# Patient Record
Sex: FEMALE | Race: BLACK OR AFRICAN AMERICAN | NOT HISPANIC OR LATINO
[De-identification: names, ages, dates, MRNs, and addresses within clinical notes are randomized per-mention and may not be internally consistent; named-entity substitution may affect disease eponyms.]

---

## 2017-09-19 ENCOUNTER — RECORD ABSTRACTING (OUTPATIENT)
Age: 82
End: 2017-09-19

## 2017-09-19 DIAGNOSIS — F32.2 MAJOR DEPRESSIVE DISORDER, SINGLE EPISODE, SEVERE W/OUT PSYCHOTIC FEATURES: ICD-10-CM

## 2017-09-19 DIAGNOSIS — Z98.890 OTHER SPECIFIED POSTPROCEDURAL STATES: ICD-10-CM

## 2017-09-19 DIAGNOSIS — E11.9 TYPE 2 DIABETES MELLITUS W/OUT COMPLICATIONS: ICD-10-CM
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
## 2017-09-19 RX ORDER — FUROSEMIDE 20 MG/1
20 TABLET ORAL DAILY
Refills: 0 | Status: ACTIVE | COMMUNITY

## 2017-09-19 RX ORDER — NIFEDIPINE 90 MG/1
90 TABLET, EXTENDED RELEASE ORAL DAILY
Refills: 0 | Status: ACTIVE | COMMUNITY

## 2017-09-19 RX ORDER — ASPIRIN 81 MG/1
81 TABLET, CHEWABLE ORAL DAILY
Refills: 0 | Status: ACTIVE | COMMUNITY

## 2017-09-19 RX ORDER — GABAPENTIN 300 MG/1
300 CAPSULE ORAL
Qty: 270 | Refills: 1 | Status: ACTIVE | COMMUNITY

## 2017-09-19 RX ORDER — CARVEDILOL 25 MG/1
25 TABLET, FILM COATED ORAL TWICE DAILY
Refills: 0 | Status: ACTIVE | COMMUNITY

## 2017-09-19 RX ORDER — LUBIPROSTONE 8 UG/1
8 CAPSULE, GELATIN COATED ORAL
Refills: 0 | Status: ACTIVE | COMMUNITY

## 2017-09-19 RX ORDER — BRIMONIDINE TARTRATE 1 MG/ML
0.1 SOLUTION/ DROPS OPHTHALMIC
Refills: 0 | Status: ACTIVE | COMMUNITY

## 2017-09-19 RX ORDER — POTASSIUM CHLORIDE 1500 MG/1
20 TABLET, EXTENDED RELEASE ORAL DAILY
Refills: 0 | Status: ACTIVE | COMMUNITY

## 2017-09-19 RX ORDER — BETAXOLOL HYDROCHLORIDE 2.8 MG/ML
0.25 SUSPENSION/ DROPS OPHTHALMIC
Refills: 0 | Status: ACTIVE | COMMUNITY

## 2017-09-19 RX ORDER — SITAGLIPTIN 100 MG/1
100 TABLET, FILM COATED ORAL DAILY
Refills: 0 | Status: ACTIVE | COMMUNITY

## 2017-09-19 RX ORDER — DULOXETINE HYDROCHLORIDE 60 MG/1
60 CAPSULE, DELAYED RELEASE ORAL
Refills: 0 | Status: ACTIVE | COMMUNITY

## 2017-09-19 RX ORDER — BUPROPION HYDROCHLORIDE 200 MG/1
200 TABLET, FILM COATED ORAL DAILY
Refills: 0 | Status: ACTIVE | COMMUNITY

## 2017-09-19 RX ORDER — UBIDECARENONE/VIT E ACET 100MG-5
50 MCG CAPSULE ORAL
Refills: 0 | Status: ACTIVE | COMMUNITY

## 2017-09-19 RX ORDER — PRAVASTATIN SODIUM 10 MG/1
10 TABLET ORAL DAILY
Refills: 0 | Status: ACTIVE | COMMUNITY

## 2017-09-19 RX ORDER — SUCRALFATE 1 G/10ML
1 SUSPENSION ORAL
Refills: 0 | Status: ACTIVE | COMMUNITY

## 2017-09-19 RX ORDER — HYDRALAZINE HYDROCHLORIDE 100 MG/1
100 TABLET ORAL 3 TIMES DAILY
Refills: 0 | Status: ACTIVE | COMMUNITY

## 2017-09-19 RX ORDER — HYDROCHLOROTHIAZIDE 25 MG/1
25 TABLET ORAL DAILY
Refills: 0 | Status: ACTIVE | COMMUNITY

## 2017-10-19 ENCOUNTER — APPOINTMENT (OUTPATIENT)
Dept: CARDIOLOGY | Facility: CLINIC | Age: 82
End: 2017-10-19

## 2017-11-16 ENCOUNTER — APPOINTMENT (OUTPATIENT)
Dept: PULMONOLOGY | Facility: CLINIC | Age: 82
End: 2017-11-16
Payer: MEDICARE

## 2017-11-16 VITALS
HEIGHT: 61 IN | BODY MASS INDEX: 28.89 KG/M2 | DIASTOLIC BLOOD PRESSURE: 53 MMHG | OXYGEN SATURATION: 96 % | HEART RATE: 77 BPM | SYSTOLIC BLOOD PRESSURE: 131 MMHG | WEIGHT: 153 LBS

## 2017-11-16 PROCEDURE — 99213 OFFICE O/P EST LOW 20 MIN: CPT

## 2018-03-15 ENCOUNTER — APPOINTMENT (OUTPATIENT)
Dept: PULMONOLOGY | Facility: CLINIC | Age: 83
End: 2018-03-15
Payer: MEDICARE

## 2018-03-15 VITALS — BODY MASS INDEX: 27.96 KG/M2 | WEIGHT: 148 LBS

## 2018-03-15 VITALS — DIASTOLIC BLOOD PRESSURE: 68 MMHG | SYSTOLIC BLOOD PRESSURE: 129 MMHG | HEIGHT: 61 IN | HEART RATE: 66 BPM

## 2018-03-15 PROCEDURE — 99213 OFFICE O/P EST LOW 20 MIN: CPT

## 2018-08-14 ENCOUNTER — APPOINTMENT (OUTPATIENT)
Dept: PULMONOLOGY | Facility: CLINIC | Age: 83
End: 2018-08-14
Payer: MEDICARE

## 2018-08-14 VITALS
OXYGEN SATURATION: 93 % | BODY MASS INDEX: 27.94 KG/M2 | HEART RATE: 72 BPM | SYSTOLIC BLOOD PRESSURE: 165 MMHG | WEIGHT: 148 LBS | DIASTOLIC BLOOD PRESSURE: 65 MMHG | HEIGHT: 61 IN

## 2018-08-14 DIAGNOSIS — F32.9 MAJOR DEPRESSIVE DISORDER, SINGLE EPISODE, UNSPECIFIED: ICD-10-CM

## 2018-08-14 PROCEDURE — 99213 OFFICE O/P EST LOW 20 MIN: CPT | Mod: 25

## 2018-08-14 PROCEDURE — 94010 BREATHING CAPACITY TEST: CPT

## 2018-08-14 PROCEDURE — 94729 DIFFUSING CAPACITY: CPT

## 2019-01-09 ENCOUNTER — APPOINTMENT (OUTPATIENT)
Dept: PULMONOLOGY | Facility: CLINIC | Age: 84
End: 2019-01-09
Payer: MEDICARE

## 2019-01-09 VITALS
WEIGHT: 158 LBS | DIASTOLIC BLOOD PRESSURE: 65 MMHG | BODY MASS INDEX: 29.83 KG/M2 | HEART RATE: 74 BPM | OXYGEN SATURATION: 92 % | SYSTOLIC BLOOD PRESSURE: 135 MMHG | HEIGHT: 61 IN

## 2019-01-09 PROCEDURE — 99213 OFFICE O/P EST LOW 20 MIN: CPT

## 2019-01-09 NOTE — DISCUSSION/SUMMARY
[FreeTextEntry1] : Patient has been using CPAP daily. She has improved as far as the symptoms are concerned. We'll continue the current level of CPAP.

## 2019-01-09 NOTE — PROCEDURE
[FreeTextEntry1] : CPAP data card reveals that she has used CPAP of 12 cm of water every day. She uses it for about 7 hours. The AHI is 3.7

## 2019-01-09 NOTE — HISTORY OF PRESENT ILLNESS
[FreeTextEntry1] : Patient with history of sleep apnea returns for followup. The patient has been on CPAP of 12 cm of water. She used it  100% of the time.\par She feels less depressed. She has been more alert during the day as per her daughter.\par  The patient has gained 10 pounds in the last 6 months.

## 2019-01-09 NOTE — PHYSICAL EXAM
[General Appearance - Well Developed] : well developed [Normal Appearance] : normal appearance [Well Groomed] : well groomed [General Appearance - Well Nourished] : well nourished [No Deformities] : no deformities [General Appearance - In No Acute Distress] : no acute distress [Normal Conjunctiva] : the conjunctiva exhibited no abnormalities [Eyelids - No Xanthelasma] : the eyelids demonstrated no xanthelasmas [Normal Oropharynx] : normal oropharynx [Neck Appearance] : the appearance of the neck was normal [Neck Cervical Mass (___cm)] : no neck mass was observed [Jugular Venous Distention Increased] : there was no jugular-venous distention [Thyroid Diffuse Enlargement] : the thyroid was not enlarged [Thyroid Nodule] : there were no palpable thyroid nodules [Heart Rate And Rhythm] : heart rate and rhythm were normal [Heart Sounds] : normal S1 and S2 [Murmurs] : no murmurs present [Respiration, Rhythm And Depth] : normal respiratory rhythm and effort [Exaggerated Use Of Accessory Muscles For Inspiration] : no accessory muscle use [Auscultation Breath Sounds / Voice Sounds] : lungs were clear to auscultation bilaterally [Abdomen Soft] : soft [Abdomen Tenderness] : non-tender [Abdomen Mass (___ Cm)] : no abdominal mass palpated [Abnormal Walk] : normal gait [Gait - Sufficient For Exercise Testing] : the gait was sufficient for exercise testing [Nail Clubbing] : no clubbing of the fingernails [Cyanosis, Localized] : no localized cyanosis [Petechial Hemorrhages (___cm)] : no petechial hemorrhages [2+ Pitting] : 2+  pitting [FreeTextEntry2] : 2+ pitting edema on the left eg [Skin Color & Pigmentation] : normal skin color and pigmentation [] : no rash [No Venous Stasis] : no venous stasis [Skin Lesions] : no skin lesions [No Skin Ulcers] : no skin ulcer [No Xanthoma] : no  xanthoma was observed [Deep Tendon Reflexes (DTR)] : deep tendon reflexes were 2+ and symmetric [Sensation] : the sensory exam was normal to light touch and pinprick [No Focal Deficits] : no focal deficits [Oriented To Time, Place, And Person] : oriented to person, place, and time [Impaired Insight] : insight and judgment were intact [Affect] : the affect was normal

## 2019-01-09 NOTE — REVIEW OF SYSTEMS
[Recent Wt Gain (___ Lbs)] : recent [unfilled] ~Ulb weight gain [Difficulty Initiating Sleep] : no difficulty falling asleep [Difficulty Maintaining Sleep] : no difficulty maintaining sleep [Negative] : Sleep Disorder

## 2019-02-27 ENCOUNTER — APPOINTMENT (OUTPATIENT)
Dept: PULMONOLOGY | Facility: CLINIC | Age: 84
End: 2019-02-27

## 2019-07-08 ENCOUNTER — APPOINTMENT (OUTPATIENT)
Dept: PULMONOLOGY | Facility: CLINIC | Age: 84
End: 2019-07-08
Payer: MEDICARE

## 2019-07-08 VITALS
OXYGEN SATURATION: 96 % | BODY MASS INDEX: 29.27 KG/M2 | DIASTOLIC BLOOD PRESSURE: 60 MMHG | HEART RATE: 63 BPM | SYSTOLIC BLOOD PRESSURE: 147 MMHG | HEIGHT: 61 IN | WEIGHT: 155 LBS

## 2019-07-08 PROCEDURE — 99213 OFFICE O/P EST LOW 20 MIN: CPT

## 2019-07-08 NOTE — REVIEW OF SYSTEMS
[Myalgias] : myalgias [Constipation] : constipation [Arthralgias] : arthralgias [Negative] : Musculoskeletal

## 2019-07-08 NOTE — PHYSICAL EXAM
[General Appearance - Well Developed] : well developed [Normal Appearance] : normal appearance [Well Groomed] : well groomed [No Deformities] : no deformities [General Appearance - Well Nourished] : well nourished [General Appearance - In No Acute Distress] : no acute distress [Eyelids - No Xanthelasma] : the eyelids demonstrated no xanthelasmas [Normal Conjunctiva] : the conjunctiva exhibited no abnormalities [Normal Oropharynx] : normal oropharynx [IV] : IV [Neck Cervical Mass (___cm)] : no neck mass was observed [Neck Appearance] : the appearance of the neck was normal [Thyroid Nodule] : there were no palpable thyroid nodules [Jugular Venous Distention Increased] : there was no jugular-venous distention [Thyroid Diffuse Enlargement] : the thyroid was not enlarged [Heart Sounds] : normal S1 and S2 [Heart Rate And Rhythm] : heart rate and rhythm were normal [Murmurs] : no murmurs present [Respiration, Rhythm And Depth] : normal respiratory rhythm and effort [Abdomen Soft] : soft [Auscultation Breath Sounds / Voice Sounds] : lungs were clear to auscultation bilaterally [Exaggerated Use Of Accessory Muscles For Inspiration] : no accessory muscle use [Abdomen Tenderness] : non-tender [Abdomen Mass (___ Cm)] : no abdominal mass palpated [Abnormal Walk] : normal gait [Nail Clubbing] : no clubbing of the fingernails [Gait - Sufficient For Exercise Testing] : the gait was sufficient for exercise testing [Petechial Hemorrhages (___cm)] : no petechial hemorrhages [Cyanosis, Localized] : no localized cyanosis [Skin Color & Pigmentation] : normal skin color and pigmentation [] : no ischemic changes [No Venous Stasis] : no venous stasis [Skin Lesions] : no skin lesions [No Xanthoma] : no  xanthoma was observed [No Skin Ulcers] : no skin ulcer [Deep Tendon Reflexes (DTR)] : deep tendon reflexes were 2+ and symmetric [No Focal Deficits] : no focal deficits [Sensation] : the sensory exam was normal to light touch and pinprick [Oriented To Time, Place, And Person] : oriented to person, place, and time [Affect] : the affect was normal [Impaired Insight] : insight and judgment were intact

## 2020-01-06 ENCOUNTER — APPOINTMENT (OUTPATIENT)
Dept: PULMONOLOGY | Facility: CLINIC | Age: 85
End: 2020-01-06
Payer: MEDICARE

## 2020-01-06 VITALS
OXYGEN SATURATION: 94 % | SYSTOLIC BLOOD PRESSURE: 130 MMHG | DIASTOLIC BLOOD PRESSURE: 62 MMHG | HEIGHT: 61 IN | WEIGHT: 158 LBS | BODY MASS INDEX: 29.83 KG/M2 | HEART RATE: 76 BPM

## 2020-01-06 DIAGNOSIS — I10 ESSENTIAL (PRIMARY) HYPERTENSION: ICD-10-CM

## 2020-01-06 DIAGNOSIS — J31.0 CHRONIC RHINITIS: ICD-10-CM

## 2020-01-06 PROCEDURE — 99213 OFFICE O/P EST LOW 20 MIN: CPT

## 2020-01-06 RX ORDER — FEXOFENADINE HCL 60 MG/1
60 TABLET, FILM COATED ORAL DAILY
Qty: 60 | Refills: 0 | Status: ACTIVE | COMMUNITY
Start: 2020-01-06 | End: 1900-01-01

## 2020-01-06 NOTE — PROCEDURE
[FreeTextEntry1] : The CPAP download reveals that she has used CPAP 93%.  She is on a CPAP of 12 cms of water.  The average use is 6 1/2 hours a day. The AHI is 2.3\par There is some leak.

## 2020-01-06 NOTE — HISTORY OF PRESENT ILLNESS
[FreeTextEntry1] : The patient with h/o sleep apnea returns for a follow up.  she uses CPAP 12 cms.  she uses it regularly. She thinks it helps her.\par She has stuffiness of nose.\par She has bilateral knee pain which limits her ambulation.\par She continues to have hypersomnia.

## 2020-01-06 NOTE — PHYSICAL EXAM
[General Appearance - Well Developed] : well developed [Normal Appearance] : normal appearance [General Appearance - Well Nourished] : well nourished [No Deformities] : no deformities [Well Groomed] : well groomed [General Appearance - In No Acute Distress] : no acute distress [Normal Conjunctiva] : the conjunctiva exhibited no abnormalities [Normal Oropharynx] : normal oropharynx [Eyelids - No Xanthelasma] : the eyelids demonstrated no xanthelasmas [Neck Appearance] : the appearance of the neck was normal [Neck Cervical Mass (___cm)] : no neck mass was observed [Jugular Venous Distention Increased] : there was no jugular-venous distention [Thyroid Diffuse Enlargement] : the thyroid was not enlarged [Thyroid Nodule] : there were no palpable thyroid nodules [Heart Rate And Rhythm] : heart rate and rhythm were normal [Heart Sounds] : normal S1 and S2 [Respiration, Rhythm And Depth] : normal respiratory rhythm and effort [Exaggerated Use Of Accessory Muscles For Inspiration] : no accessory muscle use [Auscultation Breath Sounds / Voice Sounds] : lungs were clear to auscultation bilaterally [Abdomen Soft] : soft [Abdomen Tenderness] : non-tender [Abdomen Mass (___ Cm)] : no abdominal mass palpated [Gait - Sufficient For Exercise Testing] : the gait was sufficient for exercise testing [Abnormal Walk] : normal gait [Cyanosis, Localized] : no localized cyanosis [Nail Clubbing] : no clubbing of the fingernails [Petechial Hemorrhages (___cm)] : no petechial hemorrhages [Skin Color & Pigmentation] : normal skin color and pigmentation [1+ Pitting] : 1+  pitting [Skin Lesions] : no skin lesions [No Venous Stasis] : no venous stasis [] : no rash [No Xanthoma] : no  xanthoma was observed [No Skin Ulcers] : no skin ulcer [Deep Tendon Reflexes (DTR)] : deep tendon reflexes were 2+ and symmetric [Sensation] : the sensory exam was normal to light touch and pinprick [No Focal Deficits] : no focal deficits [Oriented To Time, Place, And Person] : oriented to person, place, and time [Impaired Insight] : insight and judgment were intact [Affect] : the affect was normal [FreeTextEntry1] : Systolic murmur.

## 2020-01-06 NOTE — REVIEW OF SYSTEMS
[Nasal Congestion] : nasal congestion [Postnasal Drip] : postnasal drip [Arthralgias] : arthralgias [Myalgias] : myalgias [Hypersomnolence] : sleeping much more than usual [Negative] : Sleep Disorder

## 2020-01-06 NOTE — PROCEDURE
[FreeTextEntry1] : Download of CPAP shows that the patient has used CPAP for  96% of the time. The AHI is 3.0.

## 2020-01-06 NOTE — HISTORY OF PRESENT ILLNESS
[FreeTextEntry1] : Patient history sleep apnea returns for followup. The patient has been on CPAP of 12 cm of water. She uses it regularly. She feels it helps her.The patient is not active as she is limited by left knee pain. She has been advised to have a left knee replaced. She does not want to have it replaced.

## 2020-07-06 ENCOUNTER — APPOINTMENT (OUTPATIENT)
Dept: PULMONOLOGY | Facility: CLINIC | Age: 85
End: 2020-07-06
Payer: MEDICARE

## 2020-07-06 PROCEDURE — 99213 OFFICE O/P EST LOW 20 MIN: CPT | Mod: 95

## 2020-07-06 NOTE — PHYSICAL EXAM
[No Acute Distress] : no acute distress [Normal Oropharynx] : normal oropharynx [IV] : Mallampati Class: IV [Normal Appearance] : normal appearance [Normal Rate/Rhythm] : normal rate/rhythm [No Neck Mass] : no neck mass [Normal S1, S2] : normal s1, s2 [No Murmurs] : no murmurs [Clear to Auscultation Bilaterally] : clear to auscultation bilaterally [No Resp Distress] : no resp distress [Benign] : benign [No Abnormalities] : no abnormalities [Normal Gait] : normal gait [No Cyanosis] : no cyanosis [No Clubbing] : no clubbing [No Edema] : no edema [FROM] : FROM [No Focal Deficits] : no focal deficits [Normal Color/ Pigmentation] : normal color/ pigmentation [Oriented x3] : oriented x3 [Normal Affect] : normal affect

## 2020-07-06 NOTE — HISTORY OF PRESENT ILLNESS
[Never] : never [TextBox_4] : The patient with h/o sleep apnea is here for a follow up.  She has been using CPAP regularly for at least 6-7 hours a night. She cannot tolerate the full face mask.  She changed to nasal mask. That seems to be working better.  She is limited by arthritis.\par She is still bothered by hypersomnolence.

## 2020-08-03 ENCOUNTER — APPOINTMENT (OUTPATIENT)
Dept: PULMONOLOGY | Facility: CLINIC | Age: 85
End: 2020-08-03
Payer: MEDICARE

## 2020-08-03 VITALS
WEIGHT: 153.88 LBS | SYSTOLIC BLOOD PRESSURE: 184 MMHG | HEART RATE: 74 BPM | TEMPERATURE: 98.7 F | DIASTOLIC BLOOD PRESSURE: 81 MMHG | BODY MASS INDEX: 29.05 KG/M2 | HEIGHT: 61 IN | OXYGEN SATURATION: 97 %

## 2020-08-03 PROCEDURE — 99213 OFFICE O/P EST LOW 20 MIN: CPT

## 2020-08-03 NOTE — PHYSICAL EXAM
[No Acute Distress] : no acute distress [Normal Oropharynx] : normal oropharynx [IV] : Mallampati Class: IV [Normal Appearance] : normal appearance [No Neck Mass] : no neck mass [Normal S1, S2] : normal s1, s2 [Normal Rate/Rhythm] : normal rate/rhythm [No Murmurs] : no murmurs [No Resp Distress] : no resp distress [Clear to Auscultation Bilaterally] : clear to auscultation bilaterally [No Abnormalities] : no abnormalities [Benign] : benign [Normal Gait] : normal gait [No Cyanosis] : no cyanosis [No Clubbing] : no clubbing [No Edema] : no edema [FROM] : FROM [No Focal Deficits] : no focal deficits [Normal Color/ Pigmentation] : normal color/ pigmentation [Oriented x3] : oriented x3 [Normal Affect] : normal affect

## 2020-08-03 NOTE — PROCEDURE
[FreeTextEntry1] : Compliance with CPAP if 12 cms of water. is 100%. She used it for 6 hours 30 mins.  AHI is 2.0\par

## 2020-08-03 NOTE — HISTORY OF PRESENT ILLNESS
[TextBox_4] : The patient with h/o sleep apnea on CPAP 12 cms of water.\par She has been stable. She is essentially home bound. She has bilateral knee pain. She is sedentary.\par She uses CPAP all night and she sleeps well. She dozes off all day.\par

## 2020-12-31 RX ORDER — FEXOFENADINE HYDROCHLORIDE 180 MG/1
180 TABLET ORAL DAILY
Qty: 90 | Refills: 3 | Status: ACTIVE | COMMUNITY
Start: 2020-12-31 | End: 1900-01-01

## 2021-01-05 ENCOUNTER — APPOINTMENT (OUTPATIENT)
Dept: PULMONOLOGY | Facility: CLINIC | Age: 86
End: 2021-01-05
Payer: MEDICARE

## 2021-01-05 DIAGNOSIS — R05 COUGH: ICD-10-CM

## 2021-01-05 PROCEDURE — 99214 OFFICE O/P EST MOD 30 MIN: CPT | Mod: 95

## 2021-01-05 NOTE — HISTORY OF PRESENT ILLNESS
[TextBox_4] : The patient had history of sleep apnea on CPAP had a cough for the last several weeks. She has tried several cough medicines over-the-counter. She sleeps poorly at night because of cough.\par Last week she was prescribed fexofenadine by me. She continues to have slight cough. Later on she was prescribed Arnuity inhaler. She states that she feels much better with the combination of fexofenadine and inhaler. She sleeps well. She sleeps 6-8 hours a night. She is essentially homebound. [Home] : at home, [unfilled] , at the time of the visit. [Medical Office: ([facility])___] : at the medical office located in  [Other:____] : [unfilled] [Verbal consent obtained from patient] : the patient, [unfilled]

## 2021-01-15 ENCOUNTER — NON-APPOINTMENT (OUTPATIENT)
Age: 86
End: 2021-01-15

## 2021-03-09 ENCOUNTER — APPOINTMENT (OUTPATIENT)
Dept: PULMONOLOGY | Facility: CLINIC | Age: 86
End: 2021-03-09
Payer: MEDICARE

## 2021-03-09 VITALS
OXYGEN SATURATION: 95 % | HEART RATE: 70 BPM | DIASTOLIC BLOOD PRESSURE: 60 MMHG | HEIGHT: 61 IN | TEMPERATURE: 97.6 F | WEIGHT: 144.38 LBS | SYSTOLIC BLOOD PRESSURE: 144 MMHG | BODY MASS INDEX: 27.26 KG/M2

## 2021-03-09 PROCEDURE — 99213 OFFICE O/P EST LOW 20 MIN: CPT

## 2021-03-09 NOTE — HISTORY OF PRESENT ILLNESS
[TextBox_4] : Is a 89-year-old lady with history of asthma and sleep apnea here for followup. The patient had severe shortness of breath recently. She was taken to the hospital at which time she was noted to have a hemoglobin of 4.5. She was transfused 3 units of packed cells. She was noted to have guaiac-positive stools.\par Upon discharge she had upper GI series which revealed hiatus hernia. The patient is thought to have had GI bleeding due to hiatus hernia. The patient cannot tolerate iron supplements.\par Her latest hemoglobin is 10.5.\par Her dyspnea has resolved.\par She's been using CPAP at 12 cm of water regularly. Her AHI is 3.3. She finds that the pressure is too high at present.

## 2021-03-09 NOTE — PHYSICAL EXAM
[No Acute Distress] : no acute distress [Normal Oropharynx] : normal oropharynx [Normal Appearance] : normal appearance [No Neck Mass] : no neck mass [Normal Rate/Rhythm] : normal rate/rhythm [Normal S1, S2] : normal s1, s2 [No Murmurs] : no murmurs [No Resp Distress] : no resp distress [Clear to Auscultation Bilaterally] : clear to auscultation bilaterally [No Abnormalities] : no abnormalities [Benign] : benign [Normal Gait] : normal gait [No Clubbing] : no clubbing [No Cyanosis] : no cyanosis [FROM] : FROM [Normal Color/ Pigmentation] : normal color/ pigmentation [No Focal Deficits] : no focal deficits [Oriented x3] : oriented x3 [Normal Affect] : normal affect [TextBox_105] : Bilateral 1+ pitting pedal edema

## 2021-03-09 NOTE — PROCEDURE
[FreeTextEntry1] : Download from CPAP reveals that she has usedCPAP more than 95% of the time.\par AHI of 3.3

## 2021-06-08 ENCOUNTER — APPOINTMENT (OUTPATIENT)
Dept: PULMONOLOGY | Facility: CLINIC | Age: 86
End: 2021-06-08
Payer: MEDICARE

## 2021-06-08 VITALS
WEIGHT: 148 LBS | OXYGEN SATURATION: 96 % | HEIGHT: 61 IN | DIASTOLIC BLOOD PRESSURE: 83 MMHG | HEART RATE: 69 BPM | SYSTOLIC BLOOD PRESSURE: 186 MMHG | BODY MASS INDEX: 27.94 KG/M2 | TEMPERATURE: 97.7 F

## 2021-06-08 DIAGNOSIS — G47.10 HYPERSOMNIA, UNSPECIFIED: ICD-10-CM

## 2021-06-08 PROCEDURE — 99213 OFFICE O/P EST LOW 20 MIN: CPT

## 2021-06-08 NOTE — PROCEDURE
[FreeTextEntry1] : She uses CPAP 100% of the time. She is on CPAP 12 cms. AHI is normal. She uses CPAP for nearly 7 hours a day.

## 2021-06-08 NOTE — HISTORY OF PRESENT ILLNESS
[Never] : never [TextBox_4] : The patient uses CPAP regularly. She is eating well and has gained some weight.\par She uses CPAP and has no issues with it.  She is more alert during the day. It benefits her a lot.

## 2021-12-07 ENCOUNTER — APPOINTMENT (OUTPATIENT)
Dept: PULMONOLOGY | Facility: CLINIC | Age: 86
End: 2021-12-07
Payer: MEDICARE

## 2021-12-07 VITALS
SYSTOLIC BLOOD PRESSURE: 172 MMHG | OXYGEN SATURATION: 95 % | DIASTOLIC BLOOD PRESSURE: 71 MMHG | WEIGHT: 150 LBS | HEART RATE: 74 BPM | BODY MASS INDEX: 28.32 KG/M2 | HEIGHT: 61 IN

## 2021-12-07 DIAGNOSIS — M19.90 UNSPECIFIED OSTEOARTHRITIS, UNSPECIFIED SITE: ICD-10-CM

## 2021-12-07 PROCEDURE — 99214 OFFICE O/P EST MOD 30 MIN: CPT

## 2021-12-07 NOTE — HISTORY OF PRESENT ILLNESS
[TextBox_4] : Patient with history of sleep apnea returns for followup. She has been using CPAP regularly. She states that there is some issues with the machine not switching off automatically.\par The patient is unable to walk and that is her main complaint. She has backache and knee pain.

## 2021-12-07 NOTE — PHYSICAL EXAM
[No Acute Distress] : no acute distress [Normal Oropharynx] : normal oropharynx [IV] : Mallampati Class: IV [Normal Appearance] : normal appearance [No Neck Mass] : no neck mass [Normal Rate/Rhythm] : normal rate/rhythm [Normal S1, S2] : normal s1, s2 [No Murmurs] : no murmurs [No Resp Distress] : no resp distress [Clear to Auscultation Bilaterally] : clear to auscultation bilaterally [No Abnormalities] : no abnormalities [Benign] : benign [Normal Gait] : normal gait [No Clubbing] : no clubbing [No Cyanosis] : no cyanosis [No Edema] : no edema [FROM] : FROM [Normal Color/ Pigmentation] : normal color/ pigmentation [No Focal Deficits] : no focal deficits [Oriented x3] : oriented x3 [Normal Affect] : normal affect [TextBox_105] : + pitting edema

## 2021-12-07 NOTE — PROCEDURE
[FreeTextEntry1] : CPAP download reveals that she has been using CPAP 100% of the time. She is on CPAP 10 cm of water. Her AHI is 2.3.She uses it for 7 hours a night.

## 2021-12-07 NOTE — DISCUSSION/SUMMARY
[FreeTextEntry1] : CPAP machine Smart on  turned on.\par Recommend physiotherapy.\par Need a letter for Con [facility]

## 2022-06-07 ENCOUNTER — APPOINTMENT (OUTPATIENT)
Dept: PULMONOLOGY | Facility: CLINIC | Age: 87
End: 2022-06-07
Payer: MEDICARE

## 2022-06-07 VITALS
HEART RATE: 70 BPM | DIASTOLIC BLOOD PRESSURE: 71 MMHG | HEIGHT: 61 IN | BODY MASS INDEX: 29.27 KG/M2 | OXYGEN SATURATION: 96 % | SYSTOLIC BLOOD PRESSURE: 142 MMHG | WEIGHT: 155 LBS | TEMPERATURE: 98 F

## 2022-06-07 PROCEDURE — 99213 OFFICE O/P EST LOW 20 MIN: CPT

## 2022-06-07 NOTE — HISTORY OF PRESENT ILLNESS
[Never] : never [TextBox_4] : The patient with sleep apnea is here for a follow up. She uses it every night. She has no issues with it. She uses it every night and benefits from its use.\par The download from CPAP reveals that patient is on CPAP 10 cmH2O.  AHI is 3.2.  She uses it for 7 hours a night.  She uses it 100% of the time.\par \par 12/7/22 \par Patient with history of sleep apnea returns for followup. She has been using CPAP regularly. She states that there is some issues with the machine not switching off automatically.\par The patient is unable to walk and that is her main complaint. She has backache and knee pain.

## 2022-06-07 NOTE — DISCUSSION/SUMMARY
[FreeTextEntry1] : Continue the use of CPAP.\par She was encouraged to ambulate more and increase activity because of the warmer weather.

## 2022-12-05 ENCOUNTER — APPOINTMENT (OUTPATIENT)
Dept: PULMONOLOGY | Facility: CLINIC | Age: 87
End: 2022-12-05

## 2022-12-05 VITALS
SYSTOLIC BLOOD PRESSURE: 160 MMHG | BODY MASS INDEX: 29.07 KG/M2 | HEART RATE: 73 BPM | DIASTOLIC BLOOD PRESSURE: 68 MMHG | HEIGHT: 61 IN | OXYGEN SATURATION: 95 % | WEIGHT: 154 LBS

## 2022-12-05 DIAGNOSIS — J31.0 CHRONIC RHINITIS: ICD-10-CM

## 2022-12-05 PROCEDURE — 99214 OFFICE O/P EST MOD 30 MIN: CPT | Mod: 25

## 2022-12-05 PROCEDURE — 94729 DIFFUSING CAPACITY: CPT

## 2022-12-05 PROCEDURE — 94010 BREATHING CAPACITY TEST: CPT

## 2022-12-05 RX ORDER — BECLOMETHASONE DIPROPIONATE HFA 80 UG/1
80 AEROSOL, METERED RESPIRATORY (INHALATION) TWICE DAILY
Qty: 3 | Refills: 0 | Status: DISCONTINUED | COMMUNITY
Start: 2020-12-31 | End: 2022-12-05

## 2022-12-05 RX ORDER — FEXOFENADINE HYDROCHLORIDE 180 MG/1
180 TABLET ORAL DAILY
Qty: 90 | Refills: 3 | Status: ACTIVE | COMMUNITY
Start: 2022-12-05 | End: 1900-01-01

## 2022-12-05 RX ORDER — FLUTICASONE FUROATE 200 UG/1
200 POWDER RESPIRATORY (INHALATION) DAILY
Qty: 6 | Refills: 0 | Status: DISCONTINUED | COMMUNITY
Start: 2020-12-31 | End: 2022-12-05

## 2022-12-05 NOTE — PROCEDURE
[FreeTextEntry1] : Download from CPAP reveals that she is on CPAP 10 cm of water.  She uses it for 7 hours a night.  AHI is 4.  She uses it regularly.\par Pulmonary function testing done today reveals no significant evidence of obstructive airways disease.

## 2022-12-05 NOTE — DISCUSSION/SUMMARY
[FreeTextEntry1] : The patient has sleep apnea which is currently being adequately treated.  Patient will continue the use of CPAP.\par The patient has issues in the throat.  This might be related to allergic rhinitis.  Will prescribe fexofenadine for this patient.

## 2022-12-05 NOTE — HISTORY OF PRESENT ILLNESS
[Never] : never [TextBox_4] : Patient with history of sleep apnea and asthma returns for follow-up.  She complains of some thick secretions in her throat.  Since the last visit she has been using CPAP regularly.  She is on CPAP 10 cm of water which she uses daily.  Her AHI is 4.  She uses it for nearly 7 hours a day.  She has no issues with the use of CPAP.\par She complains of dryness of mouth over the last month or so.  She was admitted to the hospital in early October 2022 for about 4 days after she slipped and fell.  She had right rib fracture.  At that time she had a CAT scan which reveals right middle lobe subcentimeter nodule.  She had a similar subcentimeter nodule seen in the CAT scan done in April 2022.  The size of the nodule seems to be unchanged or smaller.\par Since the hospitalization the patient's blood pressure medication was changed to clonidine 0.2 orally 3 times a day.  Prior to that she was taking clonidine patches.  Her dryness of mouth could be explained from the increased dose of clonidine.\par Currently she has no pain related to the fracture.  She feels well.  She is able to take a deep breath.\par \par 6/7/22\par The patient with sleep apnea is here for a follow up. She uses it every night. She has no issues with it. She uses it every night and benefits from its use.\par The download from CPAP reveals that patient is on CPAP 10 cmH2O.  AHI is 3.2.  She uses it for 7 hours a night.  She uses it 100% of the time.\par \par 12/7/22 \par Patient with history of sleep apnea returns for followup. She has been using CPAP regularly. She states that there is some issues with the machine not switching off automatically.\par The patient is unable to walk and that is her main complaint. She has backache and knee pain.

## 2023-01-23 ENCOUNTER — APPOINTMENT (OUTPATIENT)
Dept: PULMONOLOGY | Facility: CLINIC | Age: 88
End: 2023-01-23
Payer: MEDICARE

## 2023-01-23 VITALS
DIASTOLIC BLOOD PRESSURE: 66 MMHG | OXYGEN SATURATION: 96 % | SYSTOLIC BLOOD PRESSURE: 148 MMHG | BODY MASS INDEX: 28.89 KG/M2 | WEIGHT: 153 LBS | HEIGHT: 61 IN | HEART RATE: 74 BPM

## 2023-01-23 DIAGNOSIS — J45.909 UNSPECIFIED ASTHMA, UNCOMPLICATED: ICD-10-CM

## 2023-01-23 DIAGNOSIS — G47.30 SLEEP APNEA, UNSPECIFIED: ICD-10-CM

## 2023-01-23 DIAGNOSIS — G47.33 OBSTRUCTIVE SLEEP APNEA (ADULT) (PEDIATRIC): ICD-10-CM

## 2023-01-23 PROCEDURE — 99213 OFFICE O/P EST LOW 20 MIN: CPT

## 2023-01-23 NOTE — HISTORY OF PRESENT ILLNESS
[TextBox_4] : The patient with h/o sleep apnea is here for a follow up. She is on CPAP 10 cms of water.  The patient uses it for 6 1/2 hours a day. AHi is 4.7\par She has been using the machine for nearly 6 years..  The machine does not turn off automatically.  Machine is malfunctioning.  She uses it regularly and she benefits from its use. She uses CPAP 100% of the time. \par \par 12/5/22\par Patient with history of sleep apnea and asthma returns for follow-up.  She complains of some thick secretions in her throat.  Since the last visit she has been using CPAP regularly.  She is on CPAP 10 cm of water which she uses daily.  Her AHI is 4.  She uses it for nearly 7 hours a day.  She has no issues with the use of CPAP.\par She complains of dryness of mouth over the last month or so.  She was admitted to the hospital in early October 2022 for about 4 days after she slipped and fell.  She had right rib fracture.  At that time she had a CAT scan which reveals right middle lobe subcentimeter nodule.  She had a similar subcentimeter nodule seen in the CAT scan done in April 2022.  The size of the nodule seems to be unchanged or smaller.\par Since the hospitalization the patient's blood pressure medication was changed to clonidine 0.2 orally 3 times a day.  Prior to that she was taking clonidine patches.  Her dryness of mouth could be explained from the increased dose of clonidine.\par Currently she has no pain related to the fracture.  She feels well.  She is able to take a deep breath.\par \par 6/7/22\par The patient with sleep apnea is here for a follow up. She uses it every night. She has no issues with it. She uses it every night and benefits from its use.\par The download from CPAP reveals that patient is on CPAP 10 cmH2O.  AHI is 3.2.  She uses it for 7 hours a night.  She uses it 100% of the time.\par \par 12/7/22 \par Patient with history of sleep apnea returns for followup. She has been using CPAP regularly. She states that there is some issues with the machine not switching off automatically.\par The patient is unable to walk and that is her main complaint. She has backache and knee pain.

## 2024-07-07 ENCOUNTER — INPATIENT (INPATIENT)
Facility: HOSPITAL | Age: 89
LOS: 9 days | Discharge: NURSING, REHAB OR LONG-TERM CARE | End: 2024-07-17
Attending: HOSPITALIST | Admitting: HOSPITALIST
Payer: MEDICARE

## 2024-07-07 VITALS
SYSTOLIC BLOOD PRESSURE: 91 MMHG | HEART RATE: 59 BPM | WEIGHT: 154.98 LBS | TEMPERATURE: 98 F | DIASTOLIC BLOOD PRESSURE: 55 MMHG | OXYGEN SATURATION: 96 % | RESPIRATION RATE: 16 BRPM

## 2024-07-07 LAB
ALBUMIN SERPL ELPH-MCNC: 3.8 G/DL — SIGNIFICANT CHANGE UP (ref 3.3–5)
ALP SERPL-CCNC: 70 U/L — SIGNIFICANT CHANGE UP (ref 40–120)
ALT FLD-CCNC: 15 U/L — SIGNIFICANT CHANGE UP (ref 4–33)
ANION GAP SERPL CALC-SCNC: 16 MMOL/L — HIGH (ref 7–14)
APTT BLD: 31.3 SEC — SIGNIFICANT CHANGE UP (ref 24.5–35.6)
AST SERPL-CCNC: 23 U/L — SIGNIFICANT CHANGE UP (ref 4–32)
BASOPHILS # BLD AUTO: 0.03 K/UL — SIGNIFICANT CHANGE UP (ref 0–0.2)
BASOPHILS NFR BLD AUTO: 0.6 % — SIGNIFICANT CHANGE UP (ref 0–2)
BILIRUB SERPL-MCNC: 0.3 MG/DL — SIGNIFICANT CHANGE UP (ref 0.2–1.2)
BLOOD GAS VENOUS COMPREHENSIVE RESULT: SIGNIFICANT CHANGE UP
BUN SERPL-MCNC: 50 MG/DL — HIGH (ref 7–23)
CALCIUM SERPL-MCNC: 8.2 MG/DL — LOW (ref 8.4–10.5)
CHLORIDE SERPL-SCNC: 98 MMOL/L — SIGNIFICANT CHANGE UP (ref 98–107)
CO2 SERPL-SCNC: 16 MMOL/L — LOW (ref 22–31)
CREAT SERPL-MCNC: 3.28 MG/DL — HIGH (ref 0.5–1.3)
EGFR: 12 ML/MIN/1.73M2 — LOW
EOSINOPHIL # BLD AUTO: 0.01 K/UL — SIGNIFICANT CHANGE UP (ref 0–0.5)
EOSINOPHIL NFR BLD AUTO: 0.2 % — SIGNIFICANT CHANGE UP (ref 0–6)
FLUAV AG NPH QL: SIGNIFICANT CHANGE UP
FLUBV AG NPH QL: SIGNIFICANT CHANGE UP
GLUCOSE SERPL-MCNC: 254 MG/DL — HIGH (ref 70–99)
HCT VFR BLD CALC: 34.8 % — SIGNIFICANT CHANGE UP (ref 34.5–45)
HGB BLD-MCNC: 11.1 G/DL — LOW (ref 11.5–15.5)
IANC: 3.01 K/UL — SIGNIFICANT CHANGE UP (ref 1.8–7.4)
IMM GRANULOCYTES NFR BLD AUTO: 0.6 % — SIGNIFICANT CHANGE UP (ref 0–0.9)
INR BLD: 1.07 RATIO — SIGNIFICANT CHANGE UP (ref 0.85–1.18)
LYMPHOCYTES # BLD AUTO: 1.09 K/UL — SIGNIFICANT CHANGE UP (ref 1–3.3)
LYMPHOCYTES # BLD AUTO: 22.3 % — SIGNIFICANT CHANGE UP (ref 13–44)
MCHC RBC-ENTMCNC: 26.3 PG — LOW (ref 27–34)
MCHC RBC-ENTMCNC: 31.9 GM/DL — LOW (ref 32–36)
MCV RBC AUTO: 82.5 FL — SIGNIFICANT CHANGE UP (ref 80–100)
MONOCYTES # BLD AUTO: 0.71 K/UL — SIGNIFICANT CHANGE UP (ref 0–0.9)
MONOCYTES NFR BLD AUTO: 14.5 % — HIGH (ref 2–14)
NEUTROPHILS # BLD AUTO: 3.01 K/UL — SIGNIFICANT CHANGE UP (ref 1.8–7.4)
NEUTROPHILS NFR BLD AUTO: 61.8 % — SIGNIFICANT CHANGE UP (ref 43–77)
NRBC # BLD: 0 /100 WBCS — SIGNIFICANT CHANGE UP (ref 0–0)
NRBC # FLD: 0 K/UL — SIGNIFICANT CHANGE UP (ref 0–0)
NT-PROBNP SERPL-SCNC: 5147 PG/ML — HIGH
PLATELET # BLD AUTO: 241 K/UL — SIGNIFICANT CHANGE UP (ref 150–400)
POTASSIUM SERPL-MCNC: 6.3 MMOL/L — CRITICAL HIGH (ref 3.5–5.3)
POTASSIUM SERPL-SCNC: 6.3 MMOL/L — CRITICAL HIGH (ref 3.5–5.3)
PROT SERPL-MCNC: 6.8 G/DL — SIGNIFICANT CHANGE UP (ref 6–8.3)
PROTHROM AB SERPL-ACNC: 12.1 SEC — SIGNIFICANT CHANGE UP (ref 9.5–13)
RBC # BLD: 4.22 M/UL — SIGNIFICANT CHANGE UP (ref 3.8–5.2)
RBC # FLD: 15.6 % — HIGH (ref 10.3–14.5)
RSV RNA NPH QL NAA+NON-PROBE: SIGNIFICANT CHANGE UP
SARS-COV-2 RNA SPEC QL NAA+PROBE: DETECTED
SODIUM SERPL-SCNC: 130 MMOL/L — LOW (ref 135–145)
TROPONIN T, HIGH SENSITIVITY RESULT: 54 NG/L — CRITICAL HIGH
WBC # BLD: 4.88 K/UL — SIGNIFICANT CHANGE UP (ref 3.8–10.5)
WBC # FLD AUTO: 4.88 K/UL — SIGNIFICANT CHANGE UP (ref 3.8–10.5)

## 2024-07-07 PROCEDURE — 71045 X-RAY EXAM CHEST 1 VIEW: CPT | Mod: 26

## 2024-07-07 PROCEDURE — 99285 EMERGENCY DEPT VISIT HI MDM: CPT

## 2024-07-07 RX ORDER — DEXAMETHASONE 1 MG/1
10 TABLET ORAL ONCE
Refills: 0 | Status: COMPLETED | OUTPATIENT
Start: 2024-07-07 | End: 2024-07-07

## 2024-07-07 RX ORDER — INSULIN REGULAR, HUMAN 100/ML
5 VIAL (ML) INJECTION ONCE
Refills: 0 | Status: COMPLETED | OUTPATIENT
Start: 2024-07-07 | End: 2024-07-07

## 2024-07-07 RX ORDER — DEXTROSE 30 % IN WATER 30 %
50 VIAL (ML) INTRAVENOUS ONCE
Refills: 0 | Status: COMPLETED | OUTPATIENT
Start: 2024-07-07 | End: 2024-07-07

## 2024-07-07 RX ADMIN — Medication 50 MILLILITER(S): at 23:57

## 2024-07-07 RX ADMIN — Medication 5 UNIT(S): at 23:57

## 2024-07-07 RX ADMIN — DEXAMETHASONE 102 MILLIGRAM(S): 1 TABLET ORAL at 22:56

## 2024-07-07 RX ADMIN — DEXAMETHASONE 10 MILLIGRAM(S): 1 TABLET ORAL at 22:23

## 2024-07-07 NOTE — ED PROVIDER NOTE - CLINICAL SUMMARY MEDICAL DECISION MAKING FREE TEXT BOX
Patient is a 89-year-old female past medical history hypertension, type 2 diabetes, presenting with hypoxia and COVID. With vital signs significant for hypoxia to room air, improved with nonrebreather.  In room hypertensive to 150s over 110s.  Presenting with hypoxia and lethargy in the setting of diagnosis of COVID-19 on home testing.  Found to be responsive to voice and following commands but lethargic, currently protecting airway.  With equal and clear lung sounds.  Differential includes but not limited to viral etiology including COVID versus bacterial pneumonia, lower likelihood cardiac etiology including heart failure.  To evaluate labs, x-ray, trial BiPAP (patient uses CPAP at night while asleep), give steroids given previous COVID positivity, likely admit.

## 2024-07-07 NOTE — ED ADULT TRIAGE NOTE - CHIEF COMPLAINT QUOTE
awake covid positive on thursday SaO2 80 at home  95% in triage on NRM  appears SOB and lethargic hx DM2HTN

## 2024-07-07 NOTE — ED ADULT NURSE NOTE - OBJECTIVE STATEMENT
BRIE RN: Pt arrives to ED room 23 A&Ox2, oriented to self and time. ambulatory at baseline. Daughter at bedside. PMHx of DM type 2, HTN. Pt daughter states she has a spinal cord stimulator placed in the left lumbar region due to spinal stenosis. Pt daughter states she tested positive for COVID on Thurday. Pt C/O weakness and lethargy x 1 week. Pt daughter states at home O2 sat was 82% which prompted arrival to ED. Pt appears to be lethargic. Pt arrives on nonrebreather, O2sat 100%. Respiratory at bedside placing patient on BiPAP. Respirations are even but slightly labored. Abdomen is soft and nondistened. capillary refill is 2 seconds bilaterally, skin is clean, dry, and intact. 20G IV placed in left wrist. Rectal temp obtained, afebrile. Labs drawn and sent. Covid swab collected and sent. Bed in lowest position, comfort measures provided, safety maintained. Report given to primary RN Nissa.

## 2024-07-07 NOTE — ED PROVIDER NOTE - NSICDXPASTMEDICALHX_GEN_ALL_CORE_FT
PAST MEDICAL HISTORY:  Depression /Anxiety     Diabetes Mellitus Type II     H/O: Glaucoma     History of Cholecystectomy     History of Colonoscopy     History of D&C x 2     History of Tonsillectomy     Neuropathy of  Lower Extremity     Personal History of Hypertension     Right Inguinal Hernia Repair     S/P Endoscopy     Spinal Cord  Stimulator

## 2024-07-07 NOTE — ED PROVIDER NOTE - CARE PLAN
1 Principal Discharge DX:	2019 novel coronavirus disease (COVID-19)  Secondary Diagnosis:	Acute hypoxic respiratory failure

## 2024-07-07 NOTE — ED ADULT NURSE NOTE - NSFALLRISKINTERV_ED_ALL_ED

## 2024-07-07 NOTE — ED PROVIDER NOTE - BIRTH SEX
[facility] HOSP - [facility]    Report of Consultation    Lu Summers Patient Status:  Inpatient    1952 MRN [id number]   Location [address] [facility] Attending Clara Rod MD   Hosp Day # 1 PCP Karen Boothe MD     Date of Admission: No  Pt has a defibrillator  No  Reaction to local anes* No    Social History Narrative    None on file            Current Medications:  [MAR Hold] mupirocin (BACTROBAN) 2% nasal ointment OINT 1 Application, 1 Application, Each Nare, BID  ceFAZolin sodium negative  Musculoskeletal: positive, see HPI  Neurological:  negative    Physical Exam:   Blood pressure 155/74, pulse 71, temperature 99 °F (37.2 °C), temperature source Oral, resp.  rate 16, height 5' 6\" (1.676 m), weight 160 lb 11.2 oz (72.9 kg), SpO2 9 Bronwyn Moore MD on 8/15/2021 at 7:59 PM          CT BRAIN OR HEAD ([id number])    Result Date: 8/15/2021  CONCLUSION:   No acute intracranial abnormality.     Dictated by (CST): Bronwyn Moore MD on 8/15/2021 at 8:14 PM     Finalized by (CST): Bronwyn Moore MD on 8 Female

## 2024-07-07 NOTE — ED PROVIDER NOTE - PHYSICAL EXAMINATION
CONSTITUTIONAL:  tired appearing, arouses and responds to voice with 1-2 words appropriately  SKIN: warm, dry  HEAD: Normocephalic; atraumatic.  ENT: No nasal discharge; airway clear.  CARD: S1, S2 normal; no murmurs, gallops, or rubs. Regular rate and rhythm.   RESP: No wheezes, rales or rhonchi. Good air movement bilaterally.   ABD: soft ntnd, no guarding, no distention, no rigidity.   EXT: No cyanosis or edema. Pulses intact throughout.  NEURO: responds appropriately to yes/no questions, follows commands

## 2024-07-07 NOTE — ED PROVIDER NOTE - ATTENDING CONTRIBUTION TO CARE
I have seen and examined the patient face to face, have reviewed and amended the HPI, PE and a/p as necessary.

## 2024-07-07 NOTE — ED PROVIDER NOTE - OBJECTIVE STATEMENT
Patient is a 89-year-old female past medical history hypertension, type 2 diabetes, presenting with hypoxia and COVID.  Patient unable to provide history, daughter at bedside providing history.  Patient on Thursday diagnosed with COVID-19 after stating that she did not feel well, has been having worsening difficulty breathing in the interim especially today.  EMS was called, was found to be hypoxic to 80% on room air, was placed on nonrebreather with improvement.  Daughter says that patient has been more lethargic during this time as well.  Has not been complaining of chest pain, swelling to extremities.  No vomiting or urine changes.  Unknown afebrile.  Daughter tested positive for COVID but has been asymptomatic. Patient is a 89-year-old female past medical history hypertension, type 2 diabetes, presenting with hypoxia and COVID.  Patient unable to provide history, daughter at bedside providing history.  Patient on Thursday diagnosed with COVID-19 after stating that she did not feel well, has been having worsening difficulty breathing in the interim especially today.  EMS was called, was found to be hypoxic to 80% on room air, was placed on nonrebreather with improvement.  Daughter says that patient has been more lethargic during this time as well.  Has not been complaining of chest pain, swelling to extremities.  No vomiting or urine changes.  Unknown T max. Daughter tested positive for COVID but has been asymptomatic.

## 2024-07-08 DIAGNOSIS — U07.1 COVID-19: ICD-10-CM

## 2024-07-08 DIAGNOSIS — R09.89 OTHER SPECIFIED SYMPTOMS AND SIGNS INVOLVING THE CIRCULATORY AND RESPIRATORY SYSTEMS: ICD-10-CM

## 2024-07-08 DIAGNOSIS — Z29.9 ENCOUNTER FOR PROPHYLACTIC MEASURES, UNSPECIFIED: ICD-10-CM

## 2024-07-08 DIAGNOSIS — H40.9 UNSPECIFIED GLAUCOMA: ICD-10-CM

## 2024-07-08 DIAGNOSIS — K92.2 GASTROINTESTINAL HEMORRHAGE, UNSPECIFIED: ICD-10-CM

## 2024-07-08 DIAGNOSIS — E11.9 TYPE 2 DIABETES MELLITUS WITHOUT COMPLICATIONS: ICD-10-CM

## 2024-07-08 DIAGNOSIS — E87.1 HYPO-OSMOLALITY AND HYPONATREMIA: ICD-10-CM

## 2024-07-08 DIAGNOSIS — N17.9 ACUTE KIDNEY FAILURE, UNSPECIFIED: ICD-10-CM

## 2024-07-08 DIAGNOSIS — I10 ESSENTIAL (PRIMARY) HYPERTENSION: ICD-10-CM

## 2024-07-08 DIAGNOSIS — F32.9 MAJOR DEPRESSIVE DISORDER, SINGLE EPISODE, UNSPECIFIED: ICD-10-CM

## 2024-07-08 LAB
ANION GAP SERPL CALC-SCNC: 16 MMOL/L — HIGH (ref 7–14)
ANION GAP SERPL CALC-SCNC: 19 MMOL/L — HIGH (ref 7–14)
ANION GAP SERPL CALC-SCNC: 21 MMOL/L — HIGH (ref 7–14)
APPEARANCE UR: ABNORMAL
APPEARANCE UR: ABNORMAL
BACTERIA # UR AUTO: NEGATIVE /HPF — SIGNIFICANT CHANGE UP
BACTERIA # UR AUTO: NEGATIVE /HPF — SIGNIFICANT CHANGE UP
BILIRUB UR-MCNC: ABNORMAL
BILIRUB UR-MCNC: ABNORMAL
BUN SERPL-MCNC: 51 MG/DL — HIGH (ref 7–23)
BUN SERPL-MCNC: 54 MG/DL — HIGH (ref 7–23)
BUN SERPL-MCNC: 55 MG/DL — HIGH (ref 7–23)
CALCIUM SERPL-MCNC: 11.4 MG/DL — HIGH (ref 8.4–10.5)
CALCIUM SERPL-MCNC: 7.9 MG/DL — LOW (ref 8.4–10.5)
CALCIUM SERPL-MCNC: 8.2 MG/DL — LOW (ref 8.4–10.5)
CAST: 6 /LPF — HIGH (ref 0–4)
CAST: 7 /LPF — HIGH (ref 0–4)
CHLORIDE SERPL-SCNC: 102 MMOL/L — SIGNIFICANT CHANGE UP (ref 98–107)
CHLORIDE SERPL-SCNC: 97 MMOL/L — LOW (ref 98–107)
CHLORIDE SERPL-SCNC: 97 MMOL/L — LOW (ref 98–107)
CO2 SERPL-SCNC: 12 MMOL/L — LOW (ref 22–31)
CO2 SERPL-SCNC: 13 MMOL/L — LOW (ref 22–31)
CO2 SERPL-SCNC: 15 MMOL/L — LOW (ref 22–31)
COLOR SPEC: SIGNIFICANT CHANGE UP
COLOR SPEC: SIGNIFICANT CHANGE UP
CREAT ?TM UR-MCNC: 76 MG/DL — SIGNIFICANT CHANGE UP
CREAT SERPL-MCNC: 3.44 MG/DL — HIGH (ref 0.5–1.3)
CREAT SERPL-MCNC: 3.57 MG/DL — HIGH (ref 0.5–1.3)
CREAT SERPL-MCNC: 3.73 MG/DL — HIGH (ref 0.5–1.3)
D DIMER BLD IA.RAPID-MCNC: 2077 NG/ML DDU — HIGH
DIFF PNL FLD: NEGATIVE — SIGNIFICANT CHANGE UP
DIFF PNL FLD: NEGATIVE — SIGNIFICANT CHANGE UP
EGFR: 11 ML/MIN/1.73M2 — LOW
EGFR: 11 ML/MIN/1.73M2 — LOW
EGFR: 12 ML/MIN/1.73M2 — LOW
GLUCOSE BLDC GLUCOMTR-MCNC: 202 MG/DL — HIGH (ref 70–99)
GLUCOSE BLDC GLUCOMTR-MCNC: 222 MG/DL — HIGH (ref 70–99)
GLUCOSE BLDC GLUCOMTR-MCNC: 292 MG/DL — HIGH (ref 70–99)
GLUCOSE BLDC GLUCOMTR-MCNC: 293 MG/DL — HIGH (ref 70–99)
GLUCOSE SERPL-MCNC: 243 MG/DL — HIGH (ref 70–99)
GLUCOSE SERPL-MCNC: 259 MG/DL — HIGH (ref 70–99)
GLUCOSE SERPL-MCNC: 283 MG/DL — HIGH (ref 70–99)
GLUCOSE UR QL: NEGATIVE MG/DL — SIGNIFICANT CHANGE UP
GLUCOSE UR QL: NEGATIVE MG/DL — SIGNIFICANT CHANGE UP
KETONES UR-MCNC: NEGATIVE MG/DL — SIGNIFICANT CHANGE UP
KETONES UR-MCNC: NEGATIVE MG/DL — SIGNIFICANT CHANGE UP
LEUKOCYTE ESTERASE UR-ACNC: ABNORMAL
LEUKOCYTE ESTERASE UR-ACNC: NEGATIVE — SIGNIFICANT CHANGE UP
MAGNESIUM SERPL-MCNC: 2.1 MG/DL — SIGNIFICANT CHANGE UP (ref 1.6–2.6)
NITRITE UR-MCNC: NEGATIVE — SIGNIFICANT CHANGE UP
NITRITE UR-MCNC: NEGATIVE — SIGNIFICANT CHANGE UP
OSMOLALITY UR: 394 MOSM/KG — SIGNIFICANT CHANGE UP (ref 50–1200)
PH UR: 5.5 — SIGNIFICANT CHANGE UP (ref 5–8)
PH UR: 6 — SIGNIFICANT CHANGE UP (ref 5–8)
PHOSPHATE SERPL-MCNC: 6.6 MG/DL — HIGH (ref 2.5–4.5)
POTASSIUM SERPL-MCNC: 5 MMOL/L — SIGNIFICANT CHANGE UP (ref 3.5–5.3)
POTASSIUM SERPL-MCNC: 5.7 MMOL/L — HIGH (ref 3.5–5.3)
POTASSIUM SERPL-MCNC: 6.3 MMOL/L — CRITICAL HIGH (ref 3.5–5.3)
POTASSIUM SERPL-SCNC: 5 MMOL/L — SIGNIFICANT CHANGE UP (ref 3.5–5.3)
POTASSIUM SERPL-SCNC: 5.7 MMOL/L — HIGH (ref 3.5–5.3)
POTASSIUM SERPL-SCNC: 6.3 MMOL/L — CRITICAL HIGH (ref 3.5–5.3)
POTASSIUM UR-SCNC: 57.4 MMOL/L — SIGNIFICANT CHANGE UP
PROCALCITONIN SERPL-MCNC: 1.78 NG/ML — HIGH (ref 0.02–0.1)
PROT ?TM UR-MCNC: 100 MG/DL — SIGNIFICANT CHANGE UP
PROT UR-MCNC: 100 MG/DL
PROT UR-MCNC: 100 MG/DL
PROT/CREAT UR-RTO: 1.3 RATIO — HIGH (ref 0–0.2)
RBC CASTS # UR COMP ASSIST: 2 /HPF — SIGNIFICANT CHANGE UP (ref 0–4)
RBC CASTS # UR COMP ASSIST: 5 /HPF — HIGH (ref 0–4)
REVIEW: SIGNIFICANT CHANGE UP
REVIEW: SIGNIFICANT CHANGE UP
SODIUM SERPL-SCNC: 128 MMOL/L — LOW (ref 135–145)
SODIUM SERPL-SCNC: 131 MMOL/L — LOW (ref 135–145)
SODIUM SERPL-SCNC: 133 MMOL/L — LOW (ref 135–145)
SODIUM UR-SCNC: 82 MMOL/L — SIGNIFICANT CHANGE UP
SP GR SPEC: 1.02 — SIGNIFICANT CHANGE UP (ref 1–1.03)
SP GR SPEC: 1.02 — SIGNIFICANT CHANGE UP (ref 1–1.03)
SQUAMOUS # UR AUTO: 1 /HPF — SIGNIFICANT CHANGE UP (ref 0–5)
SQUAMOUS # UR AUTO: 3 /HPF — SIGNIFICANT CHANGE UP (ref 0–5)
UROBILINOGEN FLD QL: 0.2 MG/DL — SIGNIFICANT CHANGE UP (ref 0.2–1)
UROBILINOGEN FLD QL: 1 MG/DL — SIGNIFICANT CHANGE UP (ref 0.2–1)
UUN UR-MCNC: 241.8 MG/DL — SIGNIFICANT CHANGE UP
WBC UR QL: 0 /HPF — SIGNIFICANT CHANGE UP (ref 0–5)
WBC UR QL: 2 /HPF — SIGNIFICANT CHANGE UP (ref 0–5)

## 2024-07-08 PROCEDURE — 71250 CT THORAX DX C-: CPT | Mod: 26,MC

## 2024-07-08 PROCEDURE — 99223 1ST HOSP IP/OBS HIGH 75: CPT | Mod: GC

## 2024-07-08 RX ORDER — PANTOPRAZOLE SODIUM 40 MG/10ML
40 INJECTION, POWDER, FOR SOLUTION INTRAVENOUS
Refills: 0 | Status: DISCONTINUED | OUTPATIENT
Start: 2024-07-08 | End: 2024-07-17

## 2024-07-08 RX ORDER — SODIUM ZIRCONIUM CYCLOSILICATE 10 G/10G
10 POWDER, FOR SUSPENSION ORAL ONCE
Refills: 0 | Status: COMPLETED | OUTPATIENT
Start: 2024-07-08 | End: 2024-07-08

## 2024-07-08 RX ORDER — DEXTROSE MONOHYDRATE 100 MG/ML
125 INJECTION, SOLUTION INTRAVENOUS ONCE
Refills: 0 | Status: DISCONTINUED | OUTPATIENT
Start: 2024-07-08 | End: 2024-07-08

## 2024-07-08 RX ORDER — BUPROPION HYDROCHLORIDE 150 MG/1
2 TABLET, EXTENDED RELEASE ORAL
Refills: 0 | DISCHARGE

## 2024-07-08 RX ORDER — INSULIN LISPRO 100 [IU]/ML
INJECTION, SOLUTION SUBCUTANEOUS EVERY 6 HOURS
Refills: 0 | Status: DISCONTINUED | OUTPATIENT
Start: 2024-07-08 | End: 2024-07-17

## 2024-07-08 RX ORDER — DEXTROSE 30 % IN WATER 30 %
50 VIAL (ML) INTRAVENOUS ONCE
Refills: 0 | Status: COMPLETED | OUTPATIENT
Start: 2024-07-08 | End: 2024-07-08

## 2024-07-08 RX ORDER — BUPROPION HYDROCHLORIDE 150 MG/1
300 TABLET, EXTENDED RELEASE ORAL DAILY
Refills: 0 | Status: DISCONTINUED | OUTPATIENT
Start: 2024-07-08 | End: 2024-07-14

## 2024-07-08 RX ORDER — DEXTROSE 30 % IN WATER 30 %
12.5 VIAL (ML) INTRAVENOUS ONCE
Refills: 0 | Status: DISCONTINUED | OUTPATIENT
Start: 2024-07-08 | End: 2024-07-08

## 2024-07-08 RX ORDER — CARVEDILOL PHOSPHATE 80 MG/1
1 CAPSULE, EXTENDED RELEASE ORAL
Refills: 0 | DISCHARGE

## 2024-07-08 RX ORDER — HEPARIN SODIUM 50 [USP'U]/ML
5000 INJECTION, SOLUTION INTRAVENOUS EVERY 8 HOURS
Refills: 0 | Status: DISCONTINUED | OUTPATIENT
Start: 2024-07-08 | End: 2024-07-11

## 2024-07-08 RX ORDER — PLECANATIDE 3 MG/1
1 TABLET ORAL
Refills: 0 | DISCHARGE

## 2024-07-08 RX ORDER — DEXTROSE MONOHYDRATE 100 MG/ML
125 INJECTION, SOLUTION INTRAVENOUS ONCE
Refills: 0 | Status: DISCONTINUED | OUTPATIENT
Start: 2024-07-08 | End: 2024-07-17

## 2024-07-08 RX ORDER — SODIUM CHLORIDE 0.9 % (FLUSH) 0.9 %
1000 SYRINGE (ML) INJECTION
Refills: 0 | Status: DISCONTINUED | OUTPATIENT
Start: 2024-07-08 | End: 2024-07-08

## 2024-07-08 RX ORDER — SODIUM BICARBONATE 650 MG/1
50 TABLET ORAL ONCE
Refills: 0 | Status: COMPLETED | OUTPATIENT
Start: 2024-07-08 | End: 2024-07-08

## 2024-07-08 RX ORDER — DEXAMETHASONE 1 MG/1
6 TABLET ORAL DAILY
Refills: 0 | Status: ACTIVE | OUTPATIENT
Start: 2024-07-08 | End: 2024-07-17

## 2024-07-08 RX ORDER — CALCIUM GLUCONATE 98 MG/ML
2 INJECTION, SOLUTION INTRAVENOUS ONCE
Refills: 0 | Status: COMPLETED | OUTPATIENT
Start: 2024-07-08 | End: 2024-07-08

## 2024-07-08 RX ORDER — DEXTROSE MONOHYDRATE AND SODIUM CHLORIDE 5; .3 G/100ML; G/100ML
1000 INJECTION, SOLUTION INTRAVENOUS
Refills: 0 | Status: DISCONTINUED | OUTPATIENT
Start: 2024-07-08 | End: 2024-07-17

## 2024-07-08 RX ORDER — INSULIN REGULAR, HUMAN 100/ML
5 VIAL (ML) INJECTION ONCE
Refills: 0 | Status: COMPLETED | OUTPATIENT
Start: 2024-07-08 | End: 2024-07-08

## 2024-07-08 RX ORDER — GLUCAGON HYDROCHLORIDE 1 MG/ML
1 INJECTION, POWDER, FOR SOLUTION INTRAMUSCULAR; INTRAVENOUS; SUBCUTANEOUS ONCE
Refills: 0 | Status: DISCONTINUED | OUTPATIENT
Start: 2024-07-08 | End: 2024-07-08

## 2024-07-08 RX ORDER — DULOXETINE HYDROCHLORIDE 20 MG/1
40 CAPSULE, DELAYED RELEASE ORAL DAILY
Refills: 0 | Status: DISCONTINUED | OUTPATIENT
Start: 2024-07-08 | End: 2024-07-14

## 2024-07-08 RX ORDER — GLUCAGON HYDROCHLORIDE 1 MG/ML
1 INJECTION, POWDER, FOR SOLUTION INTRAMUSCULAR; INTRAVENOUS; SUBCUTANEOUS ONCE
Refills: 0 | Status: DISCONTINUED | OUTPATIENT
Start: 2024-07-08 | End: 2024-07-17

## 2024-07-08 RX ORDER — INSULIN LISPRO 100 [IU]/ML
INJECTION, SOLUTION SUBCUTANEOUS AT BEDTIME
Refills: 0 | Status: DISCONTINUED | OUTPATIENT
Start: 2024-07-08 | End: 2024-07-08

## 2024-07-08 RX ORDER — BETAXOLOL HYDROCHLORIDE 2.8 MG/ML
1 SUSPENSION/ DROPS OPHTHALMIC
Refills: 0 | DISCHARGE

## 2024-07-08 RX ORDER — DEXTROSE 30 % IN WATER 30 %
25 VIAL (ML) INTRAVENOUS ONCE
Refills: 0 | Status: DISCONTINUED | OUTPATIENT
Start: 2024-07-08 | End: 2024-07-17

## 2024-07-08 RX ORDER — BRIMONIDINE TARTRATE 1.5 MG/ML
1 SOLUTION/ DROPS OPHTHALMIC
Refills: 0 | Status: DISCONTINUED | OUTPATIENT
Start: 2024-07-08 | End: 2024-07-17

## 2024-07-08 RX ORDER — DEXTROSE 30 % IN WATER 30 %
25 VIAL (ML) INTRAVENOUS ONCE
Refills: 0 | Status: DISCONTINUED | OUTPATIENT
Start: 2024-07-08 | End: 2024-07-08

## 2024-07-08 RX ORDER — DEXTROSE 30 % IN WATER 30 %
15 VIAL (ML) INTRAVENOUS ONCE
Refills: 0 | Status: DISCONTINUED | OUTPATIENT
Start: 2024-07-08 | End: 2024-07-08

## 2024-07-08 RX ORDER — DEXTROSE MONOHYDRATE AND SODIUM CHLORIDE 5; .3 G/100ML; G/100ML
1000 INJECTION, SOLUTION INTRAVENOUS
Refills: 0 | Status: DISCONTINUED | OUTPATIENT
Start: 2024-07-08 | End: 2024-07-08

## 2024-07-08 RX ORDER — INSULIN LISPRO 100 [IU]/ML
INJECTION, SOLUTION SUBCUTANEOUS
Refills: 0 | Status: DISCONTINUED | OUTPATIENT
Start: 2024-07-08 | End: 2024-07-08

## 2024-07-08 RX ORDER — NETARSUDIL AND LATANOPROST OPHTHALMIC SOLUTION, 0.02%/0.005% .2; .05 MG/ML; MG/ML
1 SOLUTION/ DROPS OPHTHALMIC; TOPICAL
Refills: 0 | DISCHARGE

## 2024-07-08 RX ORDER — DEXTROSE 30 % IN WATER 30 %
15 VIAL (ML) INTRAVENOUS ONCE
Refills: 0 | Status: DISCONTINUED | OUTPATIENT
Start: 2024-07-08 | End: 2024-07-17

## 2024-07-08 RX ORDER — DEXTROSE 30 % IN WATER 30 %
25 VIAL (ML) INTRAVENOUS ONCE
Refills: 0 | Status: COMPLETED | OUTPATIENT
Start: 2024-07-08 | End: 2024-07-08

## 2024-07-08 RX ORDER — OMEPRAZOLE 10 MG/1
1 CAPSULE, DELAYED RELEASE ORAL
Refills: 0 | DISCHARGE

## 2024-07-08 RX ORDER — DEXTROSE 30 % IN WATER 30 %
12.5 VIAL (ML) INTRAVENOUS ONCE
Refills: 0 | Status: DISCONTINUED | OUTPATIENT
Start: 2024-07-08 | End: 2024-07-17

## 2024-07-08 RX ORDER — SITAGLIPTIN 100 MG/1
1 TABLET, FILM COATED ORAL
Refills: 0 | DISCHARGE

## 2024-07-08 RX ORDER — SODIUM CHLORIDE 0.9 % (FLUSH) 0.9 %
500 SYRINGE (ML) INJECTION ONCE
Refills: 0 | Status: COMPLETED | OUTPATIENT
Start: 2024-07-08 | End: 2024-07-08

## 2024-07-08 RX ADMIN — Medication 500 MILLILITER(S): at 06:11

## 2024-07-08 RX ADMIN — HEPARIN SODIUM 5000 UNIT(S): 50 INJECTION, SOLUTION INTRAVENOUS at 23:18

## 2024-07-08 RX ADMIN — Medication 5 UNIT(S): at 05:45

## 2024-07-08 RX ADMIN — INSULIN LISPRO 4: 100 INJECTION, SOLUTION SUBCUTANEOUS at 19:09

## 2024-07-08 RX ADMIN — SODIUM BICARBONATE 50 MILLIEQUIVALENT(S): 650 TABLET ORAL at 05:32

## 2024-07-08 RX ADMIN — Medication 75 MILLILITER(S): at 07:32

## 2024-07-08 RX ADMIN — INSULIN LISPRO 3: 100 INJECTION, SOLUTION SUBCUTANEOUS at 11:41

## 2024-07-08 RX ADMIN — Medication 5 UNIT(S): at 11:36

## 2024-07-08 RX ADMIN — CALCIUM GLUCONATE 200 GRAM(S): 98 INJECTION, SOLUTION INTRAVENOUS at 11:41

## 2024-07-08 RX ADMIN — SODIUM ZIRCONIUM CYCLOSILICATE 10 GRAM(S): 10 POWDER, FOR SUSPENSION ORAL at 05:32

## 2024-07-08 RX ADMIN — BRIMONIDINE TARTRATE 1 DROP(S): 1.5 SOLUTION/ DROPS OPHTHALMIC at 19:08

## 2024-07-08 RX ADMIN — Medication 25 MILLILITER(S): at 05:32

## 2024-07-08 RX ADMIN — SODIUM ZIRCONIUM CYCLOSILICATE 10 GRAM(S): 10 POWDER, FOR SUSPENSION ORAL at 11:40

## 2024-07-08 RX ADMIN — Medication 50 MILLILITER(S): at 11:36

## 2024-07-08 NOTE — H&P ADULT - ASSESSMENT
Pt is a 89F PMH HTN, T2DM, glaucoma, depression, anemia 2/2 gi bleed presenting with hypoxic respiratory failure likely 2/2 COVID. Possible alternative considerations also include PE 2/2 COVID hypercoaguable state. Pt also found to have uremia likely 2/2 post renal urinary retention.

## 2024-07-08 NOTE — H&P ADULT - PROBLEM SELECTOR PLAN 1
- Pt hypoxic at home low 80s, in ED given nonrebreather now satting 95% on 10L  - COVID positive  - CXR and CT Chest (7/7) demonstrate lower lobe atlectasis with pleural effusion, cannot exclude PNA  - Procalcitonin elevated  - D-dimer elevated, cannot exclude resp fail with PE  2/2 covid hypercoaguable state    Plan:  - Pt not candidate for remdesevir given poor renal function  - c/w decadron 6mg IV starting 7/8 (9 days). Pt given decadron 10mg on 7/7.  - Add on abx ____ elevated procal.  - f/u V/Q scan   - f/u venous duplex LE DVT

## 2024-07-08 NOTE — H&P ADULT - NSHPREVIEWOFSYSTEMS_GEN_ALL_CORE
REVIEW OF SYSTEMS:  CONSTITUTIONAL: No fevers or chills  EYES/ENT: No acute visual changes  RESPIRATORY: No cough, wheezing, hemoptysis; positive shortness of breath  CARDIOVASCULAR: No chest pain or palpitations  GASTROINTESTINAL: No abdominal or epigastric pain. No nausea, vomiting, or hematemesis; No diarrhea or constipation. No melena or hematochezia.  GENITOURINARY: No urination

## 2024-07-08 NOTE — H&P ADULT - CONVERSATION DETAILS
Discussed with daughter at bedside, states she is HCP, documents provided. Pt daughter states pt's wishes are to be DNR/DNI. Discussed with daughter at bedside, states she is HCP, documents provided. Pt daughter states pt's wishes are to be DNR/DNI with trial of NIV    - no feeding tube  - IVF as needed  - Abx as infection occurs  - Do not use dialysis  - Supportive measures, Tx dehydration, infection, oral food intake, pain control if needed  - Please no ASA NSAIDs or Heparin, no Lovenox --> Pt has history of GI bleed, especially with anticoagulation.

## 2024-07-08 NOTE — H&P ADULT - TIME BILLING
Review of laboratory data, radiology results, consultants' recommendations, documentation in [place], discussion with patient/house staff and interdisciplinary staff (such as , social workers, etc). Interventions were performed as documented above.

## 2024-07-08 NOTE — H&P ADULT - NSICDXPASTMEDICALHX_GEN_ALL_CORE_FT
PAST MEDICAL HISTORY:  Depression /Anxiety     Depression, major     Diabetes Mellitus Type II     GI bleed     H/O: Glaucoma     History of Cholecystectomy     History of Colonoscopy     History of D&C x 2     History of Tonsillectomy     Neuropathy of  Lower Extremity     Personal History of Hypertension     Right Inguinal Hernia Repair     S/P Endoscopy     Spinal Cord  Stimulator

## 2024-07-08 NOTE — H&P ADULT - PROBLEM SELECTOR PLAN 8
Pt family reports history of GI bleed on A/C  Pt family declines A/C during this admission, understands risks benefits of hypercoagulable state during COVID    Plan:  -c/w omeprazole 40mg  - hold heparin at this time

## 2024-07-08 NOTE — H&P ADULT - HISTORY OF PRESENT ILLNESS
Pt is a 89F PMH HTN, T2DM, glaucoma, depression, anemia 2/2 gi bleed presenting with hypoxia and COVID. Pt with daughter at bedside who helped with history. Reports patient was in her usoh, when she started having fever, sob, and felt lethargic on 7/4. Pt did home COVID test which was positive. Pt family called outpt provider to get paxlovid, and pt received 3 doses from 7/5 to 7/7. Pt symptoms did not improve, and was found at home to be hypoxic to high 70s to low 80s, Tmax low 100s. EMS was called, placed on non rebreather with improvement.    ED Course:  Daughter reports patient continues to be more lethargic. Pt sat 95% on non rebreather. Pt given dexamethasone 10mg, for treatment of COVID. Found to be hyperkalemic to 6.3 and given bicarb, insulin, lokelma. Pt also noted not putting out urine, with elevated BUN/Cr. CT and CXR notable for lower lobe atelectasis with pneumonia not excluded

## 2024-07-08 NOTE — H&P ADULT - PROBLEM SELECTOR PLAN 5
Glucose elevated to 250+ this admission    Plan:  - low dose sliding scale  - hold home meds: Trulance 3mg, Januvia 50mg

## 2024-07-08 NOTE — H&P ADULT - NSHPLABSRESULTS_GEN_ALL_CORE
LABS:                          11.1   4.88  )-----------( 241      ( 07 Jul 2024 21:58 )             34.8     07-08    133<L>  |  102  |  54<H>  ----------------------------<  243<H>  5.0   |  12<L>  |  3.44<H>    Ca    11.4<H>      08 Jul 2024 14:43  Phos  6.6     07-08  Mg     2.10     07-08    TPro  6.8  /  Alb  3.8  /  TBili  0.3  /  DBili  x   /  AST  23  /  ALT  15  /  AlkPhos  70  07-07    PT/INR - ( 07 Jul 2024 21:58 )   PT: 12.1 sec;   INR: 1.07 ratio         PTT - ( 07 Jul 2024 21:58 )  PTT:31.3 sec        < from: CT Chest No Cont (07.08.24 @ 04:33) >    IMPRESSION:  Bilateral lower lobe consolidation with volume loss favored to represent   atelectasis. Superimposed pneumonia not excluded. No groundglass   opacities.    Trace right pleural effusion.    Indeterminant 1.8 cm right renal lesion which can be further evaluated   with contrast-enhanced MRI on nonemergent basis as clinically warranted.    < end of copied text >        < from: Xray Chest 1 View- PORTABLE-Urgent (07.07.24 @ 23:43) >    IMPRESSION:  Left lower lung field airspace opacity likely representing linear   atelectasis.    Small right pleural effusion.    < end of copied text >

## 2024-07-08 NOTE — H&P ADULT - DOES PATIENT HAVE ADVANCE DIRECTIVE
Yes
I have fully discussed the medical management and delivery of care with the patient. I have discussed any available labs, imaging and treatment options with the patient. Patient confirms understanding and has been given detailed return precautions. Patient instructed to return to the ED should symptoms persist or worsen. Patient has demonstrated capacity and has verbalized understanding. Patient is well appearing upon discharge.

## 2024-07-08 NOTE — H&P ADULT - PROBLEM SELECTOR PLAN 2
Pt presented with urinary retention x 24h  BUN/Cr elevated to 51/3.57  CT shows Indeterminant 1.8 cm right renal lesion (7/7)  Saavedra placed 7/8, dark yellow urine. Mental status improved  U/A positive for small amounts of bilirubin, protein 100, trace LEC. Negative bacteria, ketones, blood, WBC, RBC, b  FENa 2.8%, suggestive of Intrinsic etiology  Hyperkalemic to 6.3 --> 2 rounds of lokelma, bicarb, insulin. K+ now 5.0  Hypocalcemia --> s/p calcium gluconate    Plan:  -f/u U/S kidney (not performed due to covid positive)  -f/u bladder scan  -d/c'ed maintenence fluids  -continue to monitor BMP for K+, BUN/Cr. Ca and Phos levels

## 2024-07-08 NOTE — ED ADULT NURSE REASSESSMENT NOTE - CONDITION
MD discontinued bipap and placed patient on 02 6 liters N/C . 98% oxygenation noted. Daughter at bedside. NAD noted. Will continue to monitor. Bed in lowest position. Second set of blood cultures sent.
Patient is A & O x 2. NAD noted. Vital signs as documented.MD aware. Daughter at bedside. NAD noted. Will continue to monitor.
Spoke to MD about no urine output. NAD noted. Will continue to monitor. Resting peacefully.
Report taken from IDRIS Young Patient resting comfortably on bipap. NAD noted. Will continue to monitor.

## 2024-07-08 NOTE — H&P ADULT - ATTENDING COMMENTS
89 y.o. F with PMH of NIKA on CPAP, glaucoma, T2DM, HTN who presents with lethargy and hypoxia, found to be COVID positive. Also with renal failure.     #Acute hypoxemic respiratory failure:   -CT with consolidation, consistent with atelectasis but unable to r/o PNA  -desated to 91% on 6 L NC, escalated to NRB @ 10L   -on BIPAP briefly, if respiratory status worsens, will need to restart BIPAP  -PE also on differential, elevated D-dimer, f/u VQ scan    #COVID 19:   -possible PNA, as read on CT  -c/w decadron due to hypoxia  -not a candidate for Remdesivir due to renal failure  -given clinical picture and elevated procal, will treat for possible superimposed bacterial PNA with CTX  -was initiated on SQ heparin. Family declines heparin due to concern for GIB in the past. Family would be willing to consider a/c if PE is found    #ANAMARIA:   -per family, no known hx of CKD  -Fena consistent with intrinsic pathology, casts in u/a also. Likely in setting of infection  -pt with decreased urination, contreras placed  -trend I’s and o’s    #Hyperkalemia:   -in setting of ANAMARIA, s/p temporizing measures and lokelma  -monitor K  -repeat BMP tonight    #T2DM with hyperglycemia:   -likely exacerbated due to decadron administration  -currently NPO due to lethargy  -moderate ISS q6h    #GOC: DNR/DNI    #Dispo:   -pending further w/u and clinical improvement

## 2024-07-08 NOTE — H&P ADULT - NSHPPHYSICALEXAM_GEN_ALL_CORE
Gen: mild distress  CV: regular rate and rhythym, normal S1/S2, no murmurs, rubs, or gallops  Resp: Increased respiratory effort. lungs clear to auscultation bilaterally, no rales, rhonchi, or wheezes  GI: soft, nontender, mildly distended, BSx4  MSK: extremities atraumatic, no edema  Skin: warm, dry, no rashes or lesions  Neuro: no focal deficits  Psych: alert and oriented x1

## 2024-07-08 NOTE — H&P ADULT - PROBLEM SELECTOR PLAN 4
BP stable 110-130/55-75 this admission    Plan:  - hold home meds below  - Procardia 90mg XL, coreg 25mg BID, hydralazine 100mg BID, bumex 1mg, losartan 100mg

## 2024-07-09 ENCOUNTER — TRANSCRIPTION ENCOUNTER (OUTPATIENT)
Age: 89
End: 2024-07-09

## 2024-07-09 DIAGNOSIS — E87.5 HYPERKALEMIA: ICD-10-CM

## 2024-07-09 DIAGNOSIS — E87.20 ACIDOSIS, UNSPECIFIED: ICD-10-CM

## 2024-07-09 LAB
ANION GAP SERPL CALC-SCNC: 19 MMOL/L — HIGH (ref 7–14)
ANION GAP SERPL CALC-SCNC: 19 MMOL/L — HIGH (ref 7–14)
BUN SERPL-MCNC: 73 MG/DL — HIGH (ref 7–23)
BUN SERPL-MCNC: 78 MG/DL — HIGH (ref 7–23)
CALCIUM SERPL-MCNC: 8.2 MG/DL — LOW (ref 8.4–10.5)
CALCIUM SERPL-MCNC: 8.3 MG/DL — LOW (ref 8.4–10.5)
CHLORIDE SERPL-SCNC: 99 MMOL/L — SIGNIFICANT CHANGE UP (ref 98–107)
CHLORIDE SERPL-SCNC: 99 MMOL/L — SIGNIFICANT CHANGE UP (ref 98–107)
CO2 SERPL-SCNC: 15 MMOL/L — LOW (ref 22–31)
CO2 SERPL-SCNC: 17 MMOL/L — LOW (ref 22–31)
CREAT SERPL-MCNC: 4.42 MG/DL — HIGH (ref 0.5–1.3)
CREAT SERPL-MCNC: 4.5 MG/DL — HIGH (ref 0.5–1.3)
CULTURE RESULTS: NO GROWTH — SIGNIFICANT CHANGE UP
EGFR: 8 ML/MIN/1.73M2 — LOW
EGFR: 9 ML/MIN/1.73M2 — LOW
GLUCOSE BLDC GLUCOMTR-MCNC: 158 MG/DL — HIGH (ref 70–99)
GLUCOSE BLDC GLUCOMTR-MCNC: 158 MG/DL — HIGH (ref 70–99)
GLUCOSE BLDC GLUCOMTR-MCNC: 171 MG/DL — HIGH (ref 70–99)
GLUCOSE BLDC GLUCOMTR-MCNC: 171 MG/DL — HIGH (ref 70–99)
GLUCOSE BLDC GLUCOMTR-MCNC: 172 MG/DL — HIGH (ref 70–99)
GLUCOSE BLDC GLUCOMTR-MCNC: 181 MG/DL — HIGH (ref 70–99)
GLUCOSE BLDC GLUCOMTR-MCNC: 188 MG/DL — HIGH (ref 70–99)
GLUCOSE BLDC GLUCOMTR-MCNC: 195 MG/DL — HIGH (ref 70–99)
GLUCOSE SERPL-MCNC: 162 MG/DL — HIGH (ref 70–99)
GLUCOSE SERPL-MCNC: 176 MG/DL — HIGH (ref 70–99)
HCT VFR BLD CALC: 33.8 % — LOW (ref 34.5–45)
HGB BLD-MCNC: 11.3 G/DL — LOW (ref 11.5–15.5)
MAGNESIUM SERPL-MCNC: 2.2 MG/DL — SIGNIFICANT CHANGE UP (ref 1.6–2.6)
MAGNESIUM SERPL-MCNC: 2.3 MG/DL — SIGNIFICANT CHANGE UP (ref 1.6–2.6)
MCHC RBC-ENTMCNC: 27 PG — SIGNIFICANT CHANGE UP (ref 27–34)
MCHC RBC-ENTMCNC: 33.4 GM/DL — SIGNIFICANT CHANGE UP (ref 32–36)
MCV RBC AUTO: 80.9 FL — SIGNIFICANT CHANGE UP (ref 80–100)
NRBC # BLD: 0 /100 WBCS — SIGNIFICANT CHANGE UP (ref 0–0)
NRBC # FLD: 0.02 K/UL — HIGH (ref 0–0)
PHOSPHATE SERPL-MCNC: 7 MG/DL — HIGH (ref 2.5–4.5)
PHOSPHATE SERPL-MCNC: 7.3 MG/DL — HIGH (ref 2.5–4.5)
PLATELET # BLD AUTO: 256 K/UL — SIGNIFICANT CHANGE UP (ref 150–400)
POTASSIUM SERPL-MCNC: 5.2 MMOL/L — SIGNIFICANT CHANGE UP (ref 3.5–5.3)
POTASSIUM SERPL-MCNC: 5.3 MMOL/L — SIGNIFICANT CHANGE UP (ref 3.5–5.3)
POTASSIUM SERPL-SCNC: 5.2 MMOL/L — SIGNIFICANT CHANGE UP (ref 3.5–5.3)
POTASSIUM SERPL-SCNC: 5.3 MMOL/L — SIGNIFICANT CHANGE UP (ref 3.5–5.3)
RBC # BLD: 4.18 M/UL — SIGNIFICANT CHANGE UP (ref 3.8–5.2)
RBC # FLD: 15.2 % — HIGH (ref 10.3–14.5)
SODIUM SERPL-SCNC: 133 MMOL/L — LOW (ref 135–145)
SODIUM SERPL-SCNC: 135 MMOL/L — SIGNIFICANT CHANGE UP (ref 135–145)
SPECIMEN SOURCE: SIGNIFICANT CHANGE UP
WBC # BLD: 8.6 K/UL — SIGNIFICANT CHANGE UP (ref 3.8–10.5)
WBC # FLD AUTO: 8.6 K/UL — SIGNIFICANT CHANGE UP (ref 3.8–10.5)

## 2024-07-09 PROCEDURE — 99223 1ST HOSP IP/OBS HIGH 75: CPT

## 2024-07-09 PROCEDURE — 99233 SBSQ HOSP IP/OBS HIGH 50: CPT | Mod: GC

## 2024-07-09 RX ORDER — SODIUM ZIRCONIUM CYCLOSILICATE 10 G/10G
10 POWDER, FOR SUSPENSION ORAL ONCE
Refills: 0 | Status: COMPLETED | OUTPATIENT
Start: 2024-07-09 | End: 2024-07-09

## 2024-07-09 RX ORDER — BUMETANIDE 0.25 MG/ML
1 INJECTION INTRAMUSCULAR; INTRAVENOUS ONCE
Refills: 0 | Status: COMPLETED | OUTPATIENT
Start: 2024-07-09 | End: 2024-07-09

## 2024-07-09 RX ORDER — BUMETANIDE 0.25 MG/ML
1 INJECTION INTRAMUSCULAR; INTRAVENOUS ONCE
Refills: 0 | Status: DISCONTINUED | OUTPATIENT
Start: 2024-07-09 | End: 2024-07-09

## 2024-07-09 RX ORDER — CEFTRIAXONE SODIUM 500 MG
1000 VIAL (EA) INJECTION EVERY 24 HOURS
Refills: 0 | Status: COMPLETED | OUTPATIENT
Start: 2024-07-09 | End: 2024-07-15

## 2024-07-09 RX ADMIN — BUMETANIDE 1 MILLIGRAM(S): 0.25 INJECTION INTRAMUSCULAR; INTRAVENOUS at 16:05

## 2024-07-09 RX ADMIN — DULOXETINE HYDROCHLORIDE 40 MILLIGRAM(S): 20 CAPSULE, DELAYED RELEASE ORAL at 13:07

## 2024-07-09 RX ADMIN — INSULIN LISPRO 2: 100 INJECTION, SOLUTION SUBCUTANEOUS at 13:19

## 2024-07-09 RX ADMIN — Medication 100 MILLIGRAM(S): at 09:43

## 2024-07-09 RX ADMIN — INSULIN LISPRO 2: 100 INJECTION, SOLUTION SUBCUTANEOUS at 18:33

## 2024-07-09 RX ADMIN — DEXAMETHASONE 6 MILLIGRAM(S): 1 TABLET ORAL at 05:41

## 2024-07-09 RX ADMIN — BRIMONIDINE TARTRATE 1 DROP(S): 1.5 SOLUTION/ DROPS OPHTHALMIC at 18:36

## 2024-07-09 RX ADMIN — INSULIN LISPRO 2: 100 INJECTION, SOLUTION SUBCUTANEOUS at 02:10

## 2024-07-09 RX ADMIN — HEPARIN SODIUM 5000 UNIT(S): 50 INJECTION, SOLUTION INTRAVENOUS at 05:40

## 2024-07-09 RX ADMIN — BUPROPION HYDROCHLORIDE 300 MILLIGRAM(S): 150 TABLET, EXTENDED RELEASE ORAL at 13:06

## 2024-07-09 RX ADMIN — INSULIN LISPRO 2: 100 INJECTION, SOLUTION SUBCUTANEOUS at 09:42

## 2024-07-09 RX ADMIN — BRIMONIDINE TARTRATE 1 DROP(S): 1.5 SOLUTION/ DROPS OPHTHALMIC at 05:56

## 2024-07-09 NOTE — PROGRESS NOTE ADULT - PROBLEM SELECTOR PLAN 1
- Pt hypoxic at home low 80s, in ED given nonrebreather now satting 95% on 10L  - COVID positive  - CXR and CT Chest (7/7) demonstrate lower lobe atlectasis with pleural effusion, cannot exclude PNA  - Procalcitonin elevated  - D-dimer elevated, cannot exclude resp fail with PE  2/2 covid hypercoaguable state    Plan:  - Pt not candidate for remdesevir given poor renal function  - c/w decadron 6mg IV starting 7/8 (9 days). Pt given decadron 10mg on 7/7.  - Add on abx ____ elevated procal.  - f/u V/Q scan   - f/u venous duplex LE DVT - Pt hypoxic at home low 80s, in ED given nonrebreather now satting 95% on 10L  - COVID positive  - CXR and CT Chest (7/7) demonstrate lower lobe atlectasis with pleural effusion, cannot exclude PNA  - Procalcitonin elevated  - D-dimer elevated, cannot exclude resp fail with PE  2/2 covid hypercoaguable state    Plan:  - Pt not candidate for remdesevir given poor renal function  - c/w decadron 6mg IV starting 7/8 (9 days). Pt given decadron 10mg on 7/7.  - Add on IV ceftriaxone 1g. elevated procal.  - f/u V/Q scan   - f/u venous duplex LE DVT

## 2024-07-09 NOTE — DISCHARGE NOTE PROVIDER - NSDCCPCAREPLAN_GEN_ALL_CORE_FT
PRINCIPAL DISCHARGE DIAGNOSIS  Diagnosis: 2019 novel coronavirus disease (COVID-19)  Assessment and Plan of Treatment: You were seen and treated for COVID-19. You recieved steroids, and oxygen for treatment. Signs and symptoms of COVID include increased difficulty in breathing, fever, cough. If you have any worsening of these symptoms, please let your doctor know and seek immediate medical attention. Please take your medications as indicated.      SECONDARY DISCHARGE DIAGNOSES  Diagnosis: ANAMARIA (acute kidney injury)  Assessment and Plan of Treatment: You were diagnosed with acute kidney injury. You were treated with fluids and a contreras catheter. Signs and symptoms of an ANAMARIA include changes in urination including decreased frequency, difficulty urination.     PRINCIPAL DISCHARGE DIAGNOSIS  Diagnosis: 2019 novel coronavirus disease (COVID-19)  Assessment and Plan of Treatment: You were seen and treated for COVID-19. You recieved steroids, and oxygen for treatment. Signs and symptoms of COVID include increased difficulty in breathing, fever, cough. If you have any worsening of these symptoms, please let your doctor know and seek immediate medical attention. Please take your medications as indicated.      SECONDARY DISCHARGE DIAGNOSES  Diagnosis: ANAMARIA (acute kidney injury)  Assessment and Plan of Treatment: You were diagnosed with acute kidney injury. You were treated with fluids and a contreras catheter. Signs and symptoms of an ANAMARIA include changes in urination including decreased frequency, difficulty urination. Follow up with PCP for continued monitoring of your kidney function, Urology for contreras catheter removal.     PRINCIPAL DISCHARGE DIAGNOSIS  Diagnosis: 2019 novel coronavirus disease (COVID-19)  Assessment and Plan of Treatment: You were seen and treated for COVID-19. You recieved steroids, and oxygen for treatment. Signs and symptoms of COVID include increased difficulty in breathing, fever, cough. If you have any worsening of these symptoms, please let your doctor know and seek immediate medical attention. Please take your medications as indicated.      SECONDARY DISCHARGE DIAGNOSES  Diagnosis: ANAMARIA (acute kidney injury)  Assessment and Plan of Treatment: You were diagnosed with acute kidney injury. You were treated with fluids and a contreras catheter. Signs and symptoms of an ANAMARIA include changes in urination including decreased frequency, difficulty urination. Follow up with PCP for continued monitoring of your kidney function, Urology for contreras catheter removal. Your BUMEX and Losartan were held during your hospitalization. Follow up with your PCP to resume.

## 2024-07-09 NOTE — PATIENT PROFILE ADULT - FALL HARM RISK - HARM RISK INTERVENTIONS
Assistance with ambulation/Assistance OOB with selected safe patient handling equipment/Communicate Risk of Fall with Harm to all staff/Discuss with provider need for PT consult/Monitor gait and stability/Reinforce activity limits and safety measures with patient and family/Reorient to person, place and time as needed/Review medications for side effects contributing to fall risk/Sit up slowly, dangle for a short time, stand at bedside before walking/Tailored Fall Risk Interventions/Toileting schedule using arm’s reach rule for commode and bathroom/Use of alarms - bed, chair and/or voice tab/Visual Cue: Yellow wristband and red socks/Bed in lowest position, wheels locked, appropriate side rails in place/Call bell, personal items and telephone in reach/Instruct patient to call for assistance before getting out of bed or chair/Non-slip footwear when patient is out of bed/[place] to call system/Physically safe environment - no spills, clutter or unnecessary equipment/Purposeful Proactive Rounding/Room/bathroom lighting operational, light cord in reach

## 2024-07-09 NOTE — DISCHARGE NOTE PROVIDER - NSDCMRMEDTOKEN_GEN_ALL_CORE_FT
Betoptic S 0.25% ophthalmic suspension: 1 drop(s) in each affected eye 2 times a day  brimonidine 0.2% ophthalmic solution: 1 drop(s) in each affected eye 2 times a day  bumetanide 1 mg oral tablet: 1 tab(s) orally  carvedilol 25 mg oral tablet: 1 tab(s) orally 2 times a day  DULoxetine 40 mg oral delayed release capsule: 2 cap(s) orally  gabapentin 300 mg oral tablet: orally 2 times a day  hydrALAZINE 100 mg oral tablet: 1 tab(s) orally 2 times a day  Januvia 50 mg oral tablet: 1 tab(s) orally  losartan 100 mg oral tablet: 1 tab(s) orally once a day (in the afternoon)  NIFEdipine 90 mg oral tablet, extended release: 1 tab(s) orally  PriLOSEC 40 mg oral delayed release capsule: 1 cap(s) orally  Rocklatan 0.02%-0.005% ophthalmic solution: 1 drop(s) in each affected eye once a day (at bedtime)  Trulance 3 mg oral tablet: 1 tab(s) orally once a day  Wellbutrin  mg/12 hours oral tablet, extended release: 2 tab(s) orally once a day   Betoptic S 0.25% ophthalmic suspension: 1 drop(s) in each affected eye 2 times a day  brimonidine 0.2% ophthalmic solution: 1 drop(s) to each affected eye 2 times a day  carvedilol 25 mg oral tablet: 1 tab(s) orally 2 times a day  DULoxetine 40 mg oral delayed release capsule: 2 cap(s) orally once a day  hydrALAZINE 25 mg oral tablet: 3 tab(s) orally 3 times a day  hydrocortisone 25 mg rectal suppository: 1 suppository(ies) rectal once a day  Januvia 50 mg oral tablet: 1 tab(s) orally  NIFEdipine 90 mg oral tablet, extended release: 1 tab(s) orally  polyethylene glycol 3350 oral powder for reconstitution: 17 gram(s) orally once a day  PriLOSEC 40 mg oral delayed release capsule: 1 cap(s) orally  Rocklatan 0.02%-0.005% ophthalmic solution: 1 drop(s) in each affected eye once a day (at bedtime)  senna leaf extract oral tablet: 2 tab(s) orally once a day (at bedtime)  Trulance 3 mg oral tablet: 1 tab(s) orally once a day  Wellbutrin  mg/12 hours oral tablet, extended release: 2 tab(s) orally once a day

## 2024-07-09 NOTE — CONSULT NOTE ADULT - PROBLEM SELECTOR RECOMMENDATION 3
Pt. with metabolic acidosis in the setting of ANAMARIA. SCO2 remains low at 15 this morning. Recommend to check VBG for pH. Monitor pH, and SCO2.    If you have any questions, please feel free to contact me  Mackenzie Shell  Nephrology  [phone] / [employer] Teams(Preferred)  (After 4 pm or on weekends please page the on-call fellow)

## 2024-07-09 NOTE — CONSULT NOTE ADULT - PROBLEM SELECTOR RECOMMENDATION 2
Pt. with hyperkalemia in the setting of ANAMARIA. Serum potassium improved to 5.2 this morning with medical management. Monitor serum potassium.

## 2024-07-09 NOTE — PROGRESS NOTE ADULT - PROBLEM SELECTOR PLAN 2
Pt presented with urinary retention x 24h  BUN/Cr elevated to 51/3.57  CT shows Indeterminant 1.8 cm right renal lesion (7/7)  Saavedra placed 7/8, dark yellow urine. Mental status improved  U/A positive for small amounts of bilirubin, protein 100, trace LEC. Negative bacteria, ketones, blood, WBC, RBC, b  FENa 2.8%, suggestive of Intrinsic etiology  Hyperkalemic to 6.3 --> 2 rounds of lokelma, bicarb, insulin. K+ now 5.0  Hypocalcemia --> s/p calcium gluconate    Plan:  -f/u U/S kidney (not performed due to covid positive)  -f/u bladder scan  -d/c'ed maintenence fluids  -continue to monitor BMP for K+, BUN/Cr. Ca and Phos levels Pt presented with urinary retention x 24h  BUN/Cr elevated to 51/3.57, worsening to 73/4.5  CT shows Indeterminant 1.8 cm right renal lesion (7/7)  Saavedra placed 7/8, dark yellow urine. Mental status improved  U/A positive for small amounts of bilirubin, protein 100, trace LEC. Negative bacteria, ketones, blood, WBC, RBC, b  FENa 2.8%, suggestive of Intrinsic etiology  Hyperkalemic to 6.3 --> 2 rounds of lokelma, bicarb, insulin. K+ now 5.0  Hypocalcemia --> s/p calcium gluconate  Oligura --> patient urine output 400mL in 24h    Plan:  -nephro consulted, f/u recs  -started Bumex IV 1g  -f/u U/S kidney (not performed due to covid positive)  -f/u bladder scan  -d/c'ed maintenence fluids  -continue to monitor BID BMP for K+, BUN/Cr. Ca and Phos levels Pt presented with urinary retention x 24h  BUN/Cr elevated to 51/3.57, worsening to 73/4.5  CT shows Indeterminant 1.8 cm right renal lesion (7/7)  Saavedra placed 7/8, dark yellow urine. Mental status improved  U/A positive for small amounts of bilirubin, protein 100, trace LEC. Negative bacteria, ketones, blood, WBC, RBC, b  FENa 2.8%, suggestive of Intrinsic etiology  Hyperkalemic to 6.3 --> 2 rounds of lokelma, bicarb, insulin. K+ now 5.0  Hypocalcemia --> s/p calcium gluconate  Oligura --> patient urine output 400mL in 24h    Plan:  -nephro recs as below  -started Bumex IV 1g  -f/u U/S kidney (not performed due to covid positive)  -f/u bladder scan  -d/c'ed maintenence fluids  -continue to monitor BID BMP for K+, BUN/Cr. Ca and Phos levels  -repeat VBG

## 2024-07-09 NOTE — PROGRESS NOTE ADULT - PROBLEM SELECTOR PLAN 9
DVT: SCD  Diet:  Code Status: DNR/DNI w NIV    Dispo: pending DVT: SCD  Diet: pending bedside dysphagia   Code Status: DNR/DNI w NIV    Dispo: pending

## 2024-07-09 NOTE — PROGRESS NOTE ADULT - SUBJECTIVE AND OBJECTIVE BOX
Progress Note    24 (1d)    Patient is a 89y old  Female who presents with a chief complaint of COVID w/ hypoxia (2024 07:27)      Subjective / Overnight Events :  - No acute events overnight.  - Pt seen and examined at bedside.     Additional ROS (if any):    MEDICATIONS  (STANDING):  brimonidine 0.2% Ophthalmic Solution 1 Drop(s) Both EYES two times a day  buPROPion XL (24-Hour) . 300 milliGRAM(s) Oral daily  dexAMETHasone  Injectable 6 milliGRAM(s) IV Push daily  dextrose 10% Bolus 125 milliLiter(s) IV Bolus once  dextrose 5%. 1000 milliLiter(s) (50 mL/Hr) IV Continuous <Continuous>  dextrose 5%. 1000 milliLiter(s) (100 mL/Hr) IV Continuous <Continuous>  dextrose 50% Injectable 25 Gram(s) IV Push once  dextrose 50% Injectable 12.5 Gram(s) IV Push once  DULoxetine 40 milliGRAM(s) Oral daily  glucagon  Injectable 1 milliGRAM(s) IntraMuscular once  heparin   Injectable 5000 Unit(s) SubCutaneous every 8 hours  insulin lispro (ADMELOG) corrective regimen sliding scale   SubCutaneous every 6 hours  pantoprazole    Tablet 40 milliGRAM(s) Oral before breakfast    MEDICATIONS  (PRN):  dextrose Oral Gel 15 Gram(s) Oral once PRN Blood Glucose LESS THAN 70 milliGRAM(s)/deciliter          PHYSICAL EXAM:  Vital Signs Last 24 Hrs  T(C): 36.4 (2024 04:53), Max: 37 (2024 07:26)  T(F): 97.6 (2024 04:53), Max: 98.6 (2024 07:26)  HR: 60 (2024 04:53) (55 - 60)  BP: 122/65 (2024 04:53) (116/51 - 137/56)  BP(mean): --  RR: 18 (2024 04:53) (16 - 21)  SpO2: 100% (2024 04:53) (91% - 100%)    Parameters below as of 2024 04:53  Patient On (Oxygen Delivery Method): mask, nonrebreather  O2 Flow (L/min): 10      I&O's Summary      General: NAD, non-toxic appearing   HEENT: PERRLA, EOMi, no scleral icterus  CV: RRR, normal S1 and S2, no m/r/g  Lungs: normal respiratory effort. CTAB, no wheezes, rales, or rhonchi  Abd: soft, nontender, nondistended  Ext: no edema, 2+ peripheral pulses   Pysch: AAOx3, appropriate affect   Neuro: grossly non-focal, moving all extremities spontaneously   Skin: no rashes or lesions     LABS:  CAPILLARY BLOOD GLUCOSE      POCT Blood Glucose.: 171 mg/dL (2024 05:51)  POCT Blood Glucose.: 188 mg/dL (2024 02:01)  POCT Blood Glucose.: 202 mg/dL (2024 22:38)  POCT Blood Glucose.: 222 mg/dL (2024 18:12)  POCT Blood Glucose.: 293 mg/dL (2024 13:42)  POCT Blood Glucose.: 292 mg/dL (2024 11:39)                              11.1   4.88  )-----------( 241      ( 2024 21:58 )             34.8       WBC Trend: 4.88<--  Hb Trend: 11.1<--    07-08    133<L>  |  102  |  54<H>  ----------------------------<  243<H>  5.0   |  12<L>  |  3.44<H>    Ca    11.4<H>      2024 14:43  Phos  6.6     07-08  Mg     2.10     07-08    TPro  6.8  /  Alb  3.8  /  TBili  0.3  /  DBili  x   /  AST  23  /  ALT  15  /  AlkPhos  70  07-07    PT/INR - ( 2024 21:58 )   PT: 12.1 sec;   INR: 1.07 ratio         PTT - ( 2024 21:58 )  PTT:31.3 sec      Urinalysis Basic - ( 2024 14:55 )    Color: Dark Yellow / Appearance: Cloudy / S.019 / pH: x  Gluc: x / Ketone: Negative mg/dL  / Bili: Small / Urobili: 1.0 mg/dL   Blood: x / Protein: 100 mg/dL / Nitrite: Negative   Leuk Esterase: Trace / RBC: 2 /HPF / WBC 2 /HPF   Sq Epi: x / Non Sq Epi: 3 /HPF / Bacteria: Negative /HPF        Culture - Blood (collected 2024 21:58)  Source: .Blood Blood-Peripheral  Preliminary Report (2024 03:01):    No growth at 24 hours          RADIOLOGY & ADDITIONAL TESTS: Reviewed Progress Note    24 (1d)    Patient is a 89y old  Female who presents with a chief complaint of COVID w/ hypoxia (2024 07:27)      Subjective / Overnight Events :  - No acute events overnight.  - Pt seen and examined at bedside.   - Pt mental status improved. Reports some sob, otherwise no other concerns. Denies f/c, n/v, d/c, cp, abd pain.    Additional ROS (if any):    MEDICATIONS  (STANDING):  brimonidine 0.2% Ophthalmic Solution 1 Drop(s) Both EYES two times a day  buPROPion XL (24-Hour) . 300 milliGRAM(s) Oral daily  dexAMETHasone  Injectable 6 milliGRAM(s) IV Push daily  dextrose 10% Bolus 125 milliLiter(s) IV Bolus once  dextrose 5%. 1000 milliLiter(s) (50 mL/Hr) IV Continuous <Continuous>  dextrose 5%. 1000 milliLiter(s) (100 mL/Hr) IV Continuous <Continuous>  dextrose 50% Injectable 25 Gram(s) IV Push once  dextrose 50% Injectable 12.5 Gram(s) IV Push once  DULoxetine 40 milliGRAM(s) Oral daily  glucagon  Injectable 1 milliGRAM(s) IntraMuscular once  heparin   Injectable 5000 Unit(s) SubCutaneous every 8 hours  insulin lispro (ADMELOG) corrective regimen sliding scale   SubCutaneous every 6 hours  pantoprazole    Tablet 40 milliGRAM(s) Oral before breakfast    MEDICATIONS  (PRN):  dextrose Oral Gel 15 Gram(s) Oral once PRN Blood Glucose LESS THAN 70 milliGRAM(s)/deciliter          PHYSICAL EXAM:  Vital Signs Last 24 Hrs  T(C): 36.4 (2024 04:53), Max: 37 (2024 07:26)  T(F): 97.6 (2024 04:53), Max: 98.6 (2024 07:26)  HR: 60 (2024 04:53) (55 - 60)  BP: 122/65 (2024 04:53) (116/51 - 137/56)  BP(mean): --  RR: 18 (2024 04:53) (16 - 21)  SpO2: 100% (2024 04:53) (91% - 100%)    Parameters below as of 2024 04:53  Patient On (Oxygen Delivery Method): mask, nonrebreather  O2 Flow (L/min): 10      I&O's Summary      General: NAD, frail appearing  CV: RRR, normal S1 and S2, no m/r/g  Lungs: increased respiratory effort. CTAB, no wheezes, rales, or rhonchi  Abd: soft, nontender, nondistended  : Saavedra in place. Dark yellow urine.  Ext: no edema  Pysch: AAOx2     LABS:  CAPILLARY BLOOD GLUCOSE      POCT Blood Glucose.: 171 mg/dL (2024 05:51)  POCT Blood Glucose.: 188 mg/dL (2024 02:01)  POCT Blood Glucose.: 202 mg/dL (2024 22:38)  POCT Blood Glucose.: 222 mg/dL (2024 18:12)  POCT Blood Glucose.: 293 mg/dL (2024 13:42)  POCT Blood Glucose.: 292 mg/dL (2024 11:39)                              11.1   4.88  )-----------( 241      ( 2024 21:58 )             34.8       WBC Trend: 4.88<--  Hb Trend: 11.1<--    07-08    133<L>  |  102  |  54<H>  ----------------------------<  243<H>  5.0   |  12<L>  |  3.44<H>    Ca    11.4<H>      2024 14:43  Phos  6.6     07-08  Mg     2.10     -08    TPro  6.8  /  Alb  3.8  /  TBili  0.3  /  DBili  x   /  AST  23  /  ALT  15  /  AlkPhos  70  07-07    PT/INR - ( 2024 21:58 )   PT: 12.1 sec;   INR: 1.07 ratio         PTT - ( 2024 21:58 )  PTT:31.3 sec      Urinalysis Basic - ( 2024 14:55 )    Color: Dark Yellow / Appearance: Cloudy / S.019 / pH: x  Gluc: x / Ketone: Negative mg/dL  / Bili: Small / Urobili: 1.0 mg/dL   Blood: x / Protein: 100 mg/dL / Nitrite: Negative   Leuk Esterase: Trace / RBC: 2 /HPF / WBC 2 /HPF   Sq Epi: x / Non Sq Epi: 3 /HPF / Bacteria: Negative /HPF        Culture - Blood (collected 2024 21:58)  Source: .Blood Blood-Peripheral  Preliminary Report (2024 03:01):    No growth at 24 hours          RADIOLOGY & ADDITIONAL TESTS: Reviewed

## 2024-07-09 NOTE — DISCHARGE NOTE PROVIDER - NSDCCPTREATMENT_GEN_ALL_CORE_FT
PRINCIPAL PROCEDURE  Procedure: XR chest AP  Findings and Treatment: IMPRESSION:  Left lower lung field airspace opacity likely representing linear   atelectasis.      SECONDARY PROCEDURE  Procedure: CT chest wo con  Findings and Treatment: IMPRESSION:  Bilateral lower lobe consolidation with volume loss favored to represent   atelectasis. Superimposed pneumonia not excluded. No groundglass   opacities.  Trace right pleural effusion.  Indeterminant 1.8 cm right renal lesion which can be further evaluated   with contrast-enhanced MRI on nonemergent basis as clinically warranted.      Procedure: XR chest AP  Findings and Treatment: IMPRESSION:  Left lower lung field airspace opacity likely representing linear   atelectasis.     PRINCIPAL PROCEDURE  Procedure: XR chest AP  Findings and Treatment: IMPRESSION:  Left lower lung field airspace opacity likely representing linear   atelectasis.      SECONDARY PROCEDURE  Procedure: CT chest wo con  Findings and Treatment: IMPRESSION:  Bilateral lower lobe consolidation with volume loss favored to represent   atelectasis. Superimposed pneumonia not excluded. No groundglass   opacities.  Trace right pleural effusion.  Indeterminant 1.8 cm right renal lesion which can be further evaluated   with contrast-enhanced MRI on nonemergent basis as clinically warranted.      Procedure: XR chest AP  Findings and Treatment: IMPRESSION:  Left lower lung field airspace opacity likely representing linear   atelectasis.    Procedure: Ultrasound, kidney and bladder  Findings and Treatment: IMPRESSION:  Right renal cysts. No solid renal mass is identified.    Procedure: Venous duplex bilateral lower  Findings and Treatment: IMPRESSION:  No evidence of deep venous thrombosis in either lower extremity.    Procedure: Transthoracic echocardiography (TTE)  Findings and Treatment: CONCLUSIONS:      1. Left ventricular systolic function is normal.   2. Probably normal right ventricular systolic function.   3. Left atrium was not well visualized and LA volume could not be calculated.   4. Right atrium was not well visualized.   5. Valves not well visualized.

## 2024-07-09 NOTE — CONSULT NOTE ADULT - PROBLEM SELECTOR RECOMMENDATION 9
Pt. with ANAMARIA in the setting of COVID-19 infection. On review of [place], SCr was WNL at 0.84 on 10/3/2015. Per review of lab results provided by family at bedside, SCr remained WNL at 0.81 on outpatient labs performed on 1/30/2024. SCr initially during this admission was elevated at 3.28 (7/7). Pt. received IVFs and urinary catheter was placed due to concern for urinary retention. SCr, however, increased to 4.50 today (7/9). Documented UOP is 400 cc over the past 24 hours. UA showed proteinuria. UPCR elevated at 1.3. No recent renal imaging available for review. Pt. with likely ATN. No plan for HD currently. Per discussion with family at bedside, wish to defer HD. Agree with intermittent IV Bumex. Obtain renal US. Monitor labs and urine output. Avoid nephrotoxins. Dose medications as per eGFR.

## 2024-07-09 NOTE — CONSULT NOTE ADULT - SUBJECTIVE AND OBJECTIVE BOX
[facility] DIVISION OF KIDNEY DISEASES AND HYPERTENSION -- [phone]  -- INITIAL CONSULT NOTE  --------------------------------------------------------------------------------    HPI: 89 yo F with h/o HTN, DM, anemia (in the setting of GI bleed), glaucoma, and depression currently admitted for respiratory failure in the setting of COVD-19 infection. Nephrology was consulted for ANAMARIA. On review of [place], SCr was WNL at 0.84 on 10/3/2015. Per review of lab results provided by family at bedside, SCr remained WNL at 0.81 on outpatient labs performed on 1/30/2024. SCr initially during this admission was elevated at 3.28 (7/7). Pt. received IVFs and urinary catheter was placed due to concern for urinary retention. SCr, however, increased to 4.50 today (7/9).    Pt. was seen and evaluated with family present in the ER earlier today. Pt. is receiving supplemental oxygen via non-rebreather. Pt. minimally conversive, unable to provide history or ROS. Per family, pt. reported fever, and SOB starting 7/4. Pt. tested positive for COVID-19 on home test. Was prescribed Paxlovid by outpatient provider but did not improve symptoms. No prior known h/o kidney disease. No recent NSAID use. Unable to obtain ROS.    PAST HISTORY  --------------------------------------------------------------------------------  PAST MEDICAL & SURGICAL HISTORY:  Personal History of Hypertension  Diabetes Mellitus Type II  Depression /Anxiety  H/O: Glaucoma  Neuropathy of  Lower Extremity    History of D&C x 2  History of Tonsillectomy  History of Cholecystectomy  Right Inguinal Hernia Repair  Spinal Cord  Stimulator  History of Colonoscopy  S/P Endoscopy  GI bleed  Depression, major  Total Knee Replacement Status  Spinal Fusion      FAMILY HISTORY:    PAST SOCIAL HISTORY:    ALLERGIES & MEDICATIONS  --------------------------------------------------------------------------------  Allergies    sulfa drugs (Unknown)  penicillins (Unknown)  iodine (Unknown)    Intolerances      Standing Inpatient Medications  brimonidine 0.2% Ophthalmic Solution 1 Drop(s) Both EYES two times a day  buMETAnide Injectable 1 milliGRAM(s) IV Push once  buPROPion XL (24-Hour) . 300 milliGRAM(s) Oral daily  cefTRIAXone   IVPB 1000 milliGRAM(s) IV Intermittent every 24 hours  dexAMETHasone  Injectable 6 milliGRAM(s) IV Push daily  dextrose 10% Bolus 125 milliLiter(s) IV Bolus once  dextrose 5%. 1000 milliLiter(s) IV Continuous <Continuous>  dextrose 5%. 1000 milliLiter(s) IV Continuous <Continuous>  dextrose 50% Injectable 12.5 Gram(s) IV Push once  dextrose 50% Injectable 25 Gram(s) IV Push once  DULoxetine 40 milliGRAM(s) Oral daily  glucagon  Injectable 1 milliGRAM(s) IntraMuscular once  heparin   Injectable 5000 Unit(s) SubCutaneous every 8 hours  insulin lispro (ADMELOG) corrective regimen sliding scale   SubCutaneous every 6 hours  pantoprazole    Tablet 40 milliGRAM(s) Oral before breakfast    PRN Inpatient Medications  dextrose Oral Gel 15 Gram(s) Oral once PRN      REVIEW OF SYSTEMS  --------------------------------------------------------------------------------  Unable to obtain ROS, see HPI    VITALS/PHYSICAL EXAM  --------------------------------------------------------------------------------  T(C): 36.6 (07-09-24 @ 08:50), Max: 36.7 (07-09-24 @ 01:10)  HR: 62 (07-09-24 @ 08:50) (55 - 62)  BP: 148/89 (07-09-24 @ 08:50) (122/62 - 148/89)  RR: 19 (07-09-24 @ 08:50) (18 - 20)  SpO2: 100% (07-09-24 @ 08:50) (97% - 100%)  Wt(kg): --    Weight (kg): 70.3 (07-07-24 @ 21:21)      07-08-24 @ 07:01  -  07-09-24 @ 07:00  --------------------------------------------------------  IN: 0 mL / OUT: 400 mL / NET: -400 mL    Physical Exam:  Gen: Elderly female, ill appearing  HEENT: +Non-rebreather  Pulm: CTA B/L  CV: S1S2  Abd: Soft, +BS   Ext: No LE edema B/L  : Urinary catheter with clear urine noted  Neuro: Awake but minimally conversive  Skin: Warm and dry  Vascular access: Peripheral IV line    LABS/STUDIES  --------------------------------------------------------------------------------              11.3   8.60  >-----------<  256      [07-09-24 @ 05:52]              33.8     133  |  99  |  73  ----------------------------<  162      [07-09-24 @ 05:52]  5.2   |  15  |  4.50        Ca     8.3     [07-09-24 @ 05:52]      Mg     2.20     [07-09-24 @ 05:52]      Phos  7.0     [07-09-24 @ 05:52]    TPro  6.8  /  Alb  3.8  /  TBili  0.3  /  DBili  x   /  AST  23  /  ALT  15  /  AlkPhos  70  [07-07-24 @ 21:58]    PT/INR: PT 12.1 , INR 1.07       [07-07-24 @ 21:58]  PTT: 31.3       [07-07-24 @ 21:58]    Creatinine Trend:  SCr 4.50 [07-09 @ 05:52]  SCr 3.44 [07-08 @ 14:43]  SCr 3.73 [07-08 @ 09:35]  SCr 3.57 [07-08 @ 01:23]  SCr 3.28 [07-07 @ 21:58]    Urine Creatinine 76      [07-08-24 @ 12:50]  Urine Protein 100      [07-08-24 @ 12:50]  Urine Sodium 82      [07-08-24 @ 12:50]  Urine Urea Nitrogen 241.8      [07-08-24 @ 12:50]  Urine Potassium 57.4      [07-08-24 @ 12:50]  Urine Osmolality 394      [07-08-24 @ 14:55]

## 2024-07-09 NOTE — DISCHARGE NOTE PROVIDER - PROVIDER TOKENS
FREE:[LAST:[Your PCP, MD],PHONE:[(   )    -],FAX:[(   )    -],FOLLOWUP:[1 week],ESTABLISHEDPATIENT:[T]]

## 2024-07-09 NOTE — DISCHARGE NOTE PROVIDER - HOSPITAL COURSE
HPI:  Pt is a 89F PMH HTN, T2DM, glaucoma, depression, anemia 2/2 gi bleed presenting with hypoxia and COVID. Pt with daughter at bedside who helped with history. Reports patient was in her usoh, when she started having fever, sob, and felt lethargic on 7/4. Pt did home COVID test which was positive. Pt family called outpt provider to get paxlovid, and pt received 3 doses from 7/5 to 7/7. Pt symptoms did not improve, and was found at home to be hypoxic to high 70s to low 80s, Tmax low 100s. EMS was called, placed on non rebreather with improvement.    ED Course:  Daughter reports patient continues to be more lethargic. Pt sat 95% on non rebreather. Pt given dexamethasone 10mg, for treatment of COVID. Found to be hyperkalemic to 6.3 and given bicarb, insulin, lokelma. Pt also noted not putting out urine, with elevated BUN/Cr. CT and CXR notable for lower lobe atelectasis with pneumonia not excluded    Hospital Course:  Patient admitted for hypoxic resp failure 2/2 likely COVID pneumonia vs PE. Patient had elevated procalcitonin, was started on IV ceftriaxone. Patient had elevated d-dimer, VQ scan ordered which demonstrated ______. Patient also found to have intrinsic ANAMARIA, likely 2/2 ATN. Nephro consulted, and reccomended continuation of IV Bumex and trending BMP. Renal Ultrasound showed ______. GOC discussion had, daughter is HCP. Patient is DNR/DNI w NIV. HPI:  Pt is a 89F PMH HTN, T2DM, glaucoma, depression, anemia 2/2 gi bleed presenting with hypoxia and COVID. Pt with daughter at bedside who helped with history. Reports patient was in her usoh, when she started having fever, sob, and felt lethargic on 7/4. Pt did home COVID test which was positive. Pt family called outpt provider to get paxlovid, and pt received 3 doses from 7/5 to 7/7. Pt symptoms did not improve, and was found at home to be hypoxic to high 70s to low 80s, Tmax low 100s. EMS was called, placed on non rebreather with improvement.    ED Course:  Daughter reports patient continues to be more lethargic. Pt sat 95% on non rebreather. Pt given dexamethasone 10mg, for treatment of COVID. Found to be hyperkalemic to 6.3 and given bicarb, insulin, lokelma. Pt also noted not putting out urine, with elevated BUN/Cr. CT and CXR notable for lower lobe atelectasis with pneumonia not excluded    Hospital Course:  Patient admitted for hypoxic resp failure 2/2 likely COVID pneumonia vs PE. Patient had elevated procalcitonin, completed course of IV ceftriaxone. Patient had elevated d-dimer. Patient also found to have mixed pre renal/instrinsic ANAMARIA, likely 2/2 ATN. Patient received 1L LR, and renal function improved. Nephro consulted, and recommended Bumex if urine output decreases. Renal Ultrasound showed Right renal cysts. No solid renal mass is identified. Bladder scan showed evidence of urinary retention, contreras was placed to help with monitoring urine output and symptom relief. Patient had episodes of hypertension to 200s/100s, home meds were restarted and patient's blood pressure improved. Patient VBG demonstrated respiratory alkalosis, started on venturi mask, and respiratory status improved. GOC discussion had, daughter is HCP. Patient is DNR/DNI w NIV. HPI:  Pt is a 89F PMH HTN, T2DM, glaucoma, depression, anemia 2/2 gi bleed presenting with hypoxia and COVID. Pt with daughter at bedside who helped with history. Reports patient was in her usoh, when she started having fever, sob, and felt lethargic on 7/4. Pt did home COVID test which was positive. Pt family called outpt provider to get paxlovid, and pt received 3 doses from 7/5 to 7/7. Pt symptoms did not improve, and was found at home to be hypoxic to high 70s to low 80s, Tmax low 100s. EMS was called, placed on non rebreather with improvement.    ED Course:  Daughter reports patient continues to be more lethargic. Pt sat 95% on non rebreather. Pt given dexamethasone 10mg, for treatment of COVID. Found to be hyperkalemic to 6.3 and given bicarb, insulin, lokelma. Pt also noted not putting out urine, with elevated BUN/Cr. CT and CXR notable for lower lobe atelectasis with pneumonia not excluded    Hospital Course:  Patient admitted for hypoxic resp failure 2/2 likely COVID pneumonia . Patient had elevated procalcitonin, completed course of IV ceftriaxone. Covid was treated with steroids, symptoms improved, Patient had elevated d-dimer. PE less likely as pt is sating well on RA, not hypoxic.  Patient also found to have mixed pre renal/instrinsic ANAMARIA, likely 2/2 ATN. Patient received 1L LR, and renal function improved. Nephro consulted, and recommended Bumex if urine output decreases. Renal Ultrasound showed Right renal cysts. No solid renal mass is identified. Bladder scan showed evidence of urinary retention, contreras was placed to help with monitoring urine output and symptom relief. Patient had episodes of hypertension to 200s/100s, home meds were restarted and patient's blood pressure improved. Patient VBG demonstrated respiratory alkalosis, started on venturi mask, and respiratory status improved. GOC discussion had, daughter is HCP. Patient is DNR/DNI w NIV.   on the day of dc pt c/o rectal pain due to hemorrhoids, takes hydrocortisone suppository, added anusol suppository, pt to be dc on contreras, TOV to be attempted in the rehab, stable for dc to rehab HPI:  Pt is a 89F PMH HTN, T2DM, glaucoma, depression, anemia 2/2 gi bleed presenting with hypoxia and COVID. Pt with daughter at bedside who helped with history. Reports patient was in her usoh, when she started having fever, sob, and felt lethargic on 7/4. Pt did home COVID test which was positive. Pt family called outpt provider to get paxlovid, and pt received 3 doses from 7/5 to 7/7. Pt symptoms did not improve, and was found at home to be hypoxic to high 70s to low 80s, Tmax low 100s. EMS was called, placed on non rebreather with improvement.    ED Course:  Daughter reports patient continues to be more lethargic. Pt sat 95% on non rebreather. Pt given dexamethasone 10mg, for treatment of COVID. Found to be hyperkalemic to 6.3 and given bicarb, insulin, lokelma. Pt also noted not putting out urine, with elevated BUN/Cr. CT and CXR notable for lower lobe atelectasis with pneumonia not excluded    Hospital Course:  Patient admitted for hypoxic resp failure 2/2 likely COVID pneumonia . Patient had elevated procalcitonin, completed course of IV ceftriaxone. Covid was treated with steroids, symptoms improved, Patient had elevated d-dimer. PE less likely as pt is sating well on RA, not hypoxic.  Patient also found to have mixed pre renal/instrinsic ANAMARIA, likely 2/2 ATN. Patient received 1L LR, and renal function improved. Nephro consulted, and recommended Bumex if urine output decreases. Renal Ultrasound showed Right renal cysts. No solid renal mass is identified. Bladder scan showed evidence of urinary retention, contreras was placed to help with monitoring urine output and symptom relief. Patient had episodes of hypertension to 200s/100s, home meds were restarted and patient's blood pressure improved. Patient VBG demonstrated respiratory alkalosis, started on venturi mask, and respiratory status improved. GOC discussion had, daughter is HCP. Patient is DNR/DNI w NIV.    pt c/o rectal pain due to hemorrhoids, takes hydrocortisone suppository, added anusol suppository, pt to be dc on contreras, TOV to be attempted in the rehab, stable for dc to rehab

## 2024-07-09 NOTE — ED ADULT NURSE REASSESSMENT NOTE - NS ED NURSE REASSESS COMMENT FT1
Aziza RN: 14 contreras placed as per order, sterility maintained. Pt tolerated well. 250cc dark yellow urine output noted. Primary RN aware.
BREAK RN: Report received from ZARA Azar. Pt at baseline mental status, breathing even and unlabored in bed. Pt denies chest pain, SOB, dizziness, headache, blurry vision, chills. Bed in lowest position, call bell within reach. Family at bedside.
Break Coverage RN: Pt A&Ox2, respirations equal and unlabored. O2 sat 100% on NRB. Pt resting in stretcher at this time, offers no complaints. FS performed. NSR on cardiac monitor. Family remains at bedside. Pt admitted, pending inpatient bed assignment. No acute distress noted. Safety maintained, bed in lowest position, side rails raised, call bell in reach.
Break RN: Patient awake and resting in stretcher; respirations even and unlabored, no signs/symptoms of acute distress. Labs collected and sent, urine collected and sent. Safety measures in place and family at bedside.
Patient received from night RN Nissa at 0820. Pt remains at baseline mental status, awake and appears to be resting comfortably in stretcher. No acute distress noted. VS as charted. Labs drawn and sent. Safety maintained, stretcher locked in lowest position with siderails up x2, call bell and personal items within reach.
Patient remains at baseline mental status. Appears to be resting comfortably in stretcher, breathing even and unlabored remains on 10L NRB mask. Vital signs as charted. NAD noted at this time. Medications administered as ordered as per eMAR. IV site clean dry and intact. Safety maintained, stretcher locked in lowest position with siderails up x2, call bell and personal items within reach.
Pt a&ox2 resting comfortably in bed. Respirations even and unlabored. No signs of distress noted. VSS. Stretcher in lowest position, wheels locked, appropriate side rails in place, call bell in reach.
Report received from ZARA Gallardo. Pt A&Ox1 to self sitting up in stretcher resting comfortably. Respirations even and unlabored on 10L non-rebreather mask at this time. Pt remains on continuous cardiac monitor, NSR noted. No acute distress noted. Pt remains with contreras catheter in place at this time, 400mL of dark cloudy output noted. Plan of care ongoing, comfort measures provided and safety measures maintained. Awaiting bed assignment.
Pt resting comfortably in bed. Respirations even and unlabored. No signs of distress noted. VSS. Pt pending bed assignment.
Pt received from previous RN Ester marrero. Respirations even and unlabored. VSS. No signs of distress noted. Family at bedside. Stretcher in lowest position, wheels locked, appropriate side rails in place, call bell in reach.
pt received from night shift handoff. pt is AxO 2. Pt normal sinus on the tele monitor. pt respirations even and unlabored. pt denies headaches, dizziness, n/v/d, abdominal pain, SOB, chest pain, or fever like symptoms. pt plan of care ongoing.

## 2024-07-09 NOTE — PATIENT PROFILE ADULT - DO YOU EVER NEED HELP READING HOSPITAL MATERIALS?
Wife was upset that surgery did not round today. Dr Tai messaged and he immediately responded. Explained plan and was able to convey information to patient. Wife called per patient, message left. Information given to night RN.    no

## 2024-07-09 NOTE — DISCHARGE NOTE PROVIDER - NSFOLLOWUPCLINICS_GEN_ALL_ED_FT
[facility] [place]  Urology  [address] [place] [address], [place] [address]  Phone: [phone]  Fax:

## 2024-07-10 LAB
ADD ON TEST-SPECIMEN IN LAB: SIGNIFICANT CHANGE UP
ANION GAP SERPL CALC-SCNC: 18 MMOL/L — HIGH (ref 7–14)
ANION GAP SERPL CALC-SCNC: 20 MMOL/L — HIGH (ref 7–14)
ANION GAP SERPL CALC-SCNC: 21 MMOL/L — HIGH (ref 7–14)
BLOOD GAS VENOUS COMPREHENSIVE RESULT: SIGNIFICANT CHANGE UP
BUN SERPL-MCNC: 85 MG/DL — HIGH (ref 7–23)
BUN SERPL-MCNC: 90 MG/DL — HIGH (ref 7–23)
BUN SERPL-MCNC: 91 MG/DL — HIGH (ref 7–23)
CALCIUM SERPL-MCNC: 8.2 MG/DL — LOW (ref 8.4–10.5)
CALCIUM SERPL-MCNC: 8.6 MG/DL — SIGNIFICANT CHANGE UP (ref 8.4–10.5)
CALCIUM SERPL-MCNC: 9.1 MG/DL — SIGNIFICANT CHANGE UP (ref 8.4–10.5)
CHLORIDE SERPL-SCNC: 100 MMOL/L — SIGNIFICANT CHANGE UP (ref 98–107)
CHLORIDE SERPL-SCNC: 102 MMOL/L — SIGNIFICANT CHANGE UP (ref 98–107)
CHLORIDE SERPL-SCNC: 105 MMOL/L — SIGNIFICANT CHANGE UP (ref 98–107)
CO2 SERPL-SCNC: 15 MMOL/L — LOW (ref 22–31)
CO2 SERPL-SCNC: 15 MMOL/L — LOW (ref 22–31)
CO2 SERPL-SCNC: 18 MMOL/L — LOW (ref 22–31)
CREAT SERPL-MCNC: 3.14 MG/DL — HIGH (ref 0.5–1.3)
CREAT SERPL-MCNC: 3.98 MG/DL — HIGH (ref 0.5–1.3)
CREAT SERPL-MCNC: 4.77 MG/DL — HIGH (ref 0.5–1.3)
EGFR: 10 ML/MIN/1.73M2 — LOW
EGFR: 13 ML/MIN/1.73M2 — LOW
EGFR: 8 ML/MIN/1.73M2 — LOW
GLUCOSE BLDC GLUCOMTR-MCNC: 127 MG/DL — HIGH (ref 70–99)
GLUCOSE BLDC GLUCOMTR-MCNC: 135 MG/DL — HIGH (ref 70–99)
GLUCOSE BLDC GLUCOMTR-MCNC: 136 MG/DL — HIGH (ref 70–99)
GLUCOSE BLDC GLUCOMTR-MCNC: 193 MG/DL — HIGH (ref 70–99)
GLUCOSE BLDC GLUCOMTR-MCNC: 231 MG/DL — HIGH (ref 70–99)
GLUCOSE SERPL-MCNC: 130 MG/DL — HIGH (ref 70–99)
GLUCOSE SERPL-MCNC: 138 MG/DL — HIGH (ref 70–99)
GLUCOSE SERPL-MCNC: 223 MG/DL — HIGH (ref 70–99)
HCT VFR BLD CALC: 35.2 % — SIGNIFICANT CHANGE UP (ref 34.5–45)
HGB BLD-MCNC: 11.3 G/DL — LOW (ref 11.5–15.5)
MAGNESIUM SERPL-MCNC: 2.4 MG/DL — SIGNIFICANT CHANGE UP (ref 1.6–2.6)
MCHC RBC-ENTMCNC: 26.7 PG — LOW (ref 27–34)
MCHC RBC-ENTMCNC: 32.1 GM/DL — SIGNIFICANT CHANGE UP (ref 32–36)
MCV RBC AUTO: 83 FL — SIGNIFICANT CHANGE UP (ref 80–100)
NRBC # BLD: 0 /100 WBCS — SIGNIFICANT CHANGE UP (ref 0–0)
NRBC # FLD: 0 K/UL — SIGNIFICANT CHANGE UP (ref 0–0)
NT-PROBNP SERPL-SCNC: 4741 PG/ML — HIGH
PHOSPHATE SERPL-MCNC: 5.9 MG/DL — HIGH (ref 2.5–4.5)
PHOSPHATE SERPL-MCNC: 7.1 MG/DL — HIGH (ref 2.5–4.5)
PHOSPHATE SERPL-MCNC: 7.7 MG/DL — HIGH (ref 2.5–4.5)
PLATELET # BLD AUTO: 260 K/UL — SIGNIFICANT CHANGE UP (ref 150–400)
POTASSIUM SERPL-MCNC: 4.3 MMOL/L — SIGNIFICANT CHANGE UP (ref 3.5–5.3)
POTASSIUM SERPL-MCNC: 4.5 MMOL/L — SIGNIFICANT CHANGE UP (ref 3.5–5.3)
POTASSIUM SERPL-MCNC: 4.5 MMOL/L — SIGNIFICANT CHANGE UP (ref 3.5–5.3)
POTASSIUM SERPL-SCNC: 4.3 MMOL/L — SIGNIFICANT CHANGE UP (ref 3.5–5.3)
POTASSIUM SERPL-SCNC: 4.5 MMOL/L — SIGNIFICANT CHANGE UP (ref 3.5–5.3)
POTASSIUM SERPL-SCNC: 4.5 MMOL/L — SIGNIFICANT CHANGE UP (ref 3.5–5.3)
RBC # BLD: 4.24 M/UL — SIGNIFICANT CHANGE UP (ref 3.8–5.2)
RBC # FLD: 15.1 % — HIGH (ref 10.3–14.5)
SODIUM SERPL-SCNC: 136 MMOL/L — SIGNIFICANT CHANGE UP (ref 135–145)
SODIUM SERPL-SCNC: 137 MMOL/L — SIGNIFICANT CHANGE UP (ref 135–145)
SODIUM SERPL-SCNC: 141 MMOL/L — SIGNIFICANT CHANGE UP (ref 135–145)
WBC # BLD: 9.09 K/UL — SIGNIFICANT CHANGE UP (ref 3.8–10.5)
WBC # FLD AUTO: 9.09 K/UL — SIGNIFICANT CHANGE UP (ref 3.8–10.5)

## 2024-07-10 PROCEDURE — 76770 US EXAM ABDO BACK WALL COMP: CPT | Mod: 26

## 2024-07-10 PROCEDURE — 93970 EXTREMITY STUDY: CPT | Mod: 26

## 2024-07-10 PROCEDURE — 99232 SBSQ HOSP IP/OBS MODERATE 35: CPT

## 2024-07-10 PROCEDURE — 99233 SBSQ HOSP IP/OBS HIGH 50: CPT | Mod: GC

## 2024-07-10 PROCEDURE — 71045 X-RAY EXAM CHEST 1 VIEW: CPT | Mod: 26

## 2024-07-10 RX ORDER — DEXTROSE MONOHYDRATE AND SODIUM CHLORIDE 5; .3 G/100ML; G/100ML
1000 INJECTION, SOLUTION INTRAVENOUS
Refills: 0 | Status: DISCONTINUED | OUTPATIENT
Start: 2024-07-10 | End: 2024-07-10

## 2024-07-10 RX ORDER — HYDRALAZINE HYDROCHLORIDE 50 MG/1
50 TABLET ORAL ONCE
Refills: 0 | Status: COMPLETED | OUTPATIENT
Start: 2024-07-10 | End: 2024-07-10

## 2024-07-10 RX ORDER — HYDRALAZINE HYDROCHLORIDE 50 MG/1
25 TABLET ORAL ONCE
Refills: 0 | Status: DISCONTINUED | OUTPATIENT
Start: 2024-07-10 | End: 2024-07-10

## 2024-07-10 RX ORDER — BUMETANIDE 0.25 MG/ML
2 INJECTION INTRAMUSCULAR; INTRAVENOUS ONCE
Refills: 0 | Status: COMPLETED | OUTPATIENT
Start: 2024-07-10 | End: 2024-07-10

## 2024-07-10 RX ORDER — LABETALOL HYDROCHLORIDE 300 MG/1
10 TABLET ORAL ONCE
Refills: 0 | Status: COMPLETED | OUTPATIENT
Start: 2024-07-10 | End: 2024-07-10

## 2024-07-10 RX ADMIN — Medication 5 MILLIGRAM(S): at 21:39

## 2024-07-10 RX ADMIN — INSULIN LISPRO 4: 100 INJECTION, SOLUTION SUBCUTANEOUS at 17:24

## 2024-07-10 RX ADMIN — INSULIN LISPRO 2: 100 INJECTION, SOLUTION SUBCUTANEOUS at 12:14

## 2024-07-10 RX ADMIN — DULOXETINE HYDROCHLORIDE 40 MILLIGRAM(S): 20 CAPSULE, DELAYED RELEASE ORAL at 13:02

## 2024-07-10 RX ADMIN — HYDRALAZINE HYDROCHLORIDE 50 MILLIGRAM(S): 50 TABLET ORAL at 18:55

## 2024-07-10 RX ADMIN — DEXTROSE MONOHYDRATE AND SODIUM CHLORIDE 100 MILLILITER(S): 5; .3 INJECTION, SOLUTION INTRAVENOUS at 11:50

## 2024-07-10 RX ADMIN — Medication 100 MILLIGRAM(S): at 09:16

## 2024-07-10 RX ADMIN — PANTOPRAZOLE SODIUM 40 MILLIGRAM(S): 40 INJECTION, POWDER, FOR SOLUTION INTRAVENOUS at 05:50

## 2024-07-10 RX ADMIN — HEPARIN SODIUM 5000 UNIT(S): 50 INJECTION, SOLUTION INTRAVENOUS at 05:49

## 2024-07-10 RX ADMIN — LABETALOL HYDROCHLORIDE 10 MILLIGRAM(S): 300 TABLET ORAL at 23:43

## 2024-07-10 RX ADMIN — BRIMONIDINE TARTRATE 1 DROP(S): 1.5 SOLUTION/ DROPS OPHTHALMIC at 17:45

## 2024-07-10 RX ADMIN — BUPROPION HYDROCHLORIDE 300 MILLIGRAM(S): 150 TABLET, EXTENDED RELEASE ORAL at 13:03

## 2024-07-10 RX ADMIN — DEXAMETHASONE 6 MILLIGRAM(S): 1 TABLET ORAL at 05:51

## 2024-07-10 RX ADMIN — BRIMONIDINE TARTRATE 1 DROP(S): 1.5 SOLUTION/ DROPS OPHTHALMIC at 05:50

## 2024-07-10 NOTE — PROGRESS NOTE ADULT - PROBLEM SELECTOR PLAN 1
- Pt hypoxic at home low 80s, in ED given nonrebreather now satting 95% on 10L  - COVID positive  - CXR and CT Chest (7/7) demonstrate lower lobe atlectasis with pleural effusion, cannot exclude PNA  - Procalcitonin elevated  - D-dimer elevated, cannot exclude resp fail with PE  2/2 covid hypercoaguable state    Plan:  - Pt not candidate for remdesevir given poor renal function  - c/w decadron 6mg IV starting 7/8 (9 days). Pt given decadron 10mg on 7/7.  - Add on IV ceftriaxone 1g. elevated procal.  - f/u V/Q scan   - f/u venous duplex LE DVT - Pt hypoxic at home low 80s, in ED given nonrebreather now satting 95% on 10L  - COVID positive  - CXR and CT Chest (7/7) demonstrate lower lobe atlectasis with pleural effusion, cannot exclude PNA  - Procalcitonin elevated  - D-dimer elevated, cannot exclude resp fail with PE  2/2 covid hypercoaguable state  - Bedside POCUS (7/10): no evidence of fluid overload    Plan:  - Pt not candidate for remdesevir given poor renal function  - c/w decadron 6mg IV starting 7/8 (9 days). Pt given decadron 10mg on 7/7.  - c/w IV ceftriaxone 1g. elevated procal.  - f/u V/Q scan   - f/u venous duplex LE DVT  - f/u TTE  - f/u CXR - Pt hypoxic at home low 80s, in ED given nonrebreather now satting 95% on 10L  - COVID positive  - CXR and CT Chest (7/7) demonstrate lower lobe atlectasis with pleural effusion, cannot exclude PNA  - Procalcitonin elevated  - D-dimer elevated, cannot exclude resp fail with PE  2/2 covid hypercoaguable state  - Bedside POCUS (7/10): no evidence of fluid overload  - venous duplex LE DVT (7/10): neg    Plan:  - Pt not candidate for remdesevir given poor renal function  - c/w decadron 6mg IV starting 7/8 (9 days). Pt given decadron 10mg on 7/7.  - c/w IV ceftriaxone 1g. elevated procal.  - transition to NC  - f/u V/Q scan   - f/u TTE  - f/u CXR - Pt hypoxic at home low 80s, in ED given nonrebreather now satting 95% on 10L  - COVID positive  - CXR and CT Chest (7/7) demonstrate lower lobe atlectasis with pleural effusion, cannot exclude PNA  - Procalcitonin elevated  - D-dimer elevated, cannot exclude resp fail with PE  2/2 covid hypercoaguable state  - Bedside POCUS (7/10): no evidence of fluid overload  - venous duplex LE DVT (7/10): neg  -proBNP elevated: 4741    Plan:  - Pt not candidate for remdesevir given poor renal function  - c/w decadron 6mg IV starting 7/8 (9 days). Pt given decadron 10mg on 7/7.  - c/w IV ceftriaxone 1g. elevated procal.  - transition to NC  - f/u V/Q scan   - f/u TTE  - f/u CXR

## 2024-07-10 NOTE — PROGRESS NOTE ADULT - PROBLEM SELECTOR PLAN 4
BP stable 110-130/55-75 this admission    Plan:  - hold home meds below  - Procardia 90mg XL, coreg 25mg BID, hydralazine 100mg BID, bumex 1mg, losartan 100mg BP stable 110-130/55-75 on admission  Now in high 150s/80s    Plan:  - hold home meds below  - Procardia 90mg XL, coreg 25mg BID, hydralazine 100mg BID, bumex 1mg, losartan 100mg

## 2024-07-10 NOTE — PROGRESS NOTE ADULT - PROBLEM SELECTOR PLAN 1
Pt. with ANAMARIA in the setting of COVID-19 infection. On review of [facility], SCr was WNL at 0.84 on 10/3/2015. Per review of lab results provided by family at bedside, SCr remained WNL at 0.81 on outpatient labs performed on 1/30/2024. SCr initially during this admission was elevated at 3.28 (7/7). Pt. received IVFs and Bumex 1 mg IV once. SCr, however, increased to 4.77 today (7/10). Documented UOP is 300 cc over the past 24 hours. UA showed proteinuria. UPCR elevated at 1.3. Kidney sonogram showed non-obstructing L renal calculus but no evidence of hydronephrosis. Pt. with likely ATN. No plan for HD currently. Per discussion with family at bedside, wish to defer HD. Recommend IV LR at 100 cc/hr. If UOP remains low despite IVFs, recommend trial of IV Bumex 2 mg. Monitor labs and urine output. Avoid nephrotoxins. Dose medications as per eGFR.

## 2024-07-10 NOTE — PROGRESS NOTE ADULT - SUBJECTIVE AND OBJECTIVE BOX
[facility] Division of Kidney Diseases & Hypertension  FOLLOW UP NOTE  [phone]--------------------------------------------------------------------------------    Chief Complaint: ANAMARIA    24 hour events/subjective: Pt. was seen and evaluated at bedside this morning. Pt. minimally conversive, unable to provide ROS.        PAST HISTORY  --------------------------------------------------------------------------------  No significant changes to PMH, PSH, FHx, SHx, unless otherwise noted    ALLERGIES & MEDICATIONS  --------------------------------------------------------------------------------  Allergies    sulfa drugs (Unknown)  penicillins (Unknown)  iodine (Unknown)    Intolerances      Standing Inpatient Medications  brimonidine 0.2% Ophthalmic Solution 1 Drop(s) Both EYES two times a day  buPROPion XL (24-Hour) . 300 milliGRAM(s) Oral daily  cefTRIAXone   IVPB 1000 milliGRAM(s) IV Intermittent every 24 hours  dexAMETHasone  Injectable 6 milliGRAM(s) IV Push daily  dextrose 10% Bolus 125 milliLiter(s) IV Bolus once  dextrose 5%. 1000 milliLiter(s) IV Continuous <Continuous>  dextrose 5%. 1000 milliLiter(s) IV Continuous <Continuous>  dextrose 50% Injectable 25 Gram(s) IV Push once  dextrose 50% Injectable 12.5 Gram(s) IV Push once  DULoxetine 40 milliGRAM(s) Oral daily  glucagon  Injectable 1 milliGRAM(s) IntraMuscular once  heparin   Injectable 5000 Unit(s) SubCutaneous every 8 hours  insulin lispro (ADMELOG) corrective regimen sliding scale   SubCutaneous every 6 hours  lactated ringers. 1000 milliLiter(s) IV Continuous <Continuous>  pantoprazole    Tablet 40 milliGRAM(s) Oral before breakfast    PRN Inpatient Medications  dextrose Oral Gel 15 Gram(s) Oral once PRN      REVIEW OF SYSTEMS  --------------------------------------------------------------------------------  Unable to obtain ROS, see HPI    VITALS/PHYSICAL EXAM  --------------------------------------------------------------------------------  T(C): 37.2 (07-10-24 @ 09:45), Max: 37.2 (07-10-24 @ 09:45)  HR: 88 (07-10-24 @ 09:45) (62 - 88)  BP: 158/87 (07-10-24 @ 09:45) (117/86 - 178/82)  RR: 20 (07-10-24 @ 09:45) (16 - 20)  SpO2: 99% (07-10-24 @ 09:45) (95% - 99%)  Wt(kg): --  Height (cm): 172.7 (07-09-24 @ 23:36)  Weight (kg): 65.2 (07-09-24 @ 23:36)  BMI (kg/m2): 21.9 (07-09-24 @ 23:36)  BSA (m2): 1.78 (07-09-24 @ 23:36)      07-09-24 @ 07:01  -  07-10-24 @ 07:00  --------------------------------------------------------  IN: 120 mL / OUT: 300 mL / NET: -180 mL      Physical Exam:  Gen: Elderly female, ill appearing  HEENT: +Nasal cannula  Pulm: Decreased air entry BL  CV: S1S2  Abd: Soft, +BS   Ext: No LE edema B/L  : Urinary catheter with clear urine noted  Neuro: Awake but minimally conversive  Skin: Warm and dry  Vascular access: Peripheral IV line    LABS/STUDIES  --------------------------------------------------------------------------------              11.3   9.09  >-----------<  260      [07-10-24 @ 03:30]              35.2     136  |  100  |  90  ----------------------------<  130      [07-10-24 @ 03:30]  4.5   |  15  |  4.77        Ca     8.2     [07-10-24 @ 03:30]      Mg     2.40     [07-10-24 @ 03:30]      Phos  7.7     [07-10-24 @ 03:30]    Creatinine Trend:  SCr 4.77 [07-10 @ 03:30]  SCr 4.42 [07-09 @ 15:03]  SCr 4.50 [07-09 @ 05:52]  SCr 3.44 [07-08 @ 14:43]  SCr 3.73 [07-08 @ 09:35]    Urine Creatinine 76      [07-08-24 @ 12:50]  Urine Protein 100      [07-08-24 @ 12:50]  Urine Sodium 82      [07-08-24 @ 12:50]  Urine Urea Nitrogen 241.8      [07-08-24 @ 12:50]  Urine Potassium 57.4      [07-08-24 @ 12:50]  Urine Osmolality 394      [07-08-24 @ 14:55]

## 2024-07-10 NOTE — PROGRESS NOTE ADULT - PROBLEM SELECTOR PLAN 8
Pt family reports history of GI bleed on A/C  Pt family declines A/C during this admission, understands risks benefits of hypercoagulable state during COVID    Plan:  -c/w omeprazole 40mg  - hold heparin at this time Pt family reports history of GI bleed on A/C  Pt family declines A/C during this admission, understands risks benefits of hypercoagulable state during COVID    Plan:  - c/w omeprazole 40mg  - hold heparin at this time

## 2024-07-10 NOTE — PROGRESS NOTE ADULT - PROBLEM SELECTOR PLAN 9
DVT: SCD  Diet: pending bedside dysphagia   Code Status: DNR/DNI w NIV    Dispo: pending DVT: SCD  Diet: pureed  Code Status: DNR/DNI w NIV  Dispo: pending

## 2024-07-10 NOTE — PROGRESS NOTE ADULT - PROBLEM SELECTOR PLAN 2
Pt presented with urinary retention x 24h  BUN/Cr elevated to 51/3.57, worsening to 73/4.5  CT shows Indeterminant 1.8 cm right renal lesion (7/7)  Saavedra placed 7/8, dark yellow urine. Mental status improved  U/A positive for small amounts of bilirubin, protein 100, trace LEC. Negative bacteria, ketones, blood, WBC, RBC, b  FENa 2.8%, suggestive of Intrinsic etiology  Hyperkalemic to 6.3 --> 2 rounds of lokelma, bicarb, insulin. K+ now 5.0  Hypocalcemia --> s/p calcium gluconate  Oligura --> patient urine output 400mL in 24h    Plan:  -nephro recs as below  -started Bumex IV 1g  -f/u U/S kidney (not performed due to covid positive)  -f/u bladder scan  -d/c'ed maintenence fluids  -continue to monitor BID BMP for K+, BUN/Cr. Ca and Phos levels  -repeat VBG Pt presented with urinary retention x 24h  BUN/Cr elevated to 51/3.57, worsening to 73/4.5  CT shows Indeterminant 1.8 cm right renal lesion (7/7)  Contreras placed 7/8, dark yellow urine. Mental status improved  U/A positive for small amounts of bilirubin, protein 100, trace LEC. Negative bacteria, ketones, blood, WBC, RBC, b  FENa 2.8%, suggestive of Intrinsic etiology  Hyperkalemic to 6.3 --> 2 rounds of lokelma, bicarb, insulin. K+ now 5.0  Hypocalcemia --> s/p calcium gluconate  Oligura --> patient urine output 400mL in 24h  -renal U/S (7/10): right renal cysts. no hydronephrosis. bladder only with contreras.    Plan:  -nephro following  -hold Bumex IV  -LR 100ml/hr  -consider sevelamer pending regular feeds  -continue to monitor BID BMP for K+, BUN/Cr. Ca and Phos levels

## 2024-07-10 NOTE — PROGRESS NOTE ADULT - PROBLEM SELECTOR PLAN 2
Pt. with hyperkalemia in the setting of ANAMARIA. Serum potassium WNL at 4.5 this morning. Monitor serum potassium.

## 2024-07-10 NOTE — PROGRESS NOTE ADULT - SUBJECTIVE AND OBJECTIVE BOX
Progress Note    07-08-24 (2d)    Patient is a 89y old  Female who presents with a chief complaint of COVID w/ hypoxia (09 Jul 2024 15:14)      Subjective / Overnight Events :  - No acute events overnight.  - Pt seen and examined at bedside.     Additional ROS (if any):    MEDICATIONS  (STANDING):  brimonidine 0.2% Ophthalmic Solution 1 Drop(s) Both EYES two times a day  buPROPion XL (24-Hour) . 300 milliGRAM(s) Oral daily  cefTRIAXone   IVPB 1000 milliGRAM(s) IV Intermittent every 24 hours  dexAMETHasone  Injectable 6 milliGRAM(s) IV Push daily  dextrose 10% Bolus 125 milliLiter(s) IV Bolus once  dextrose 5%. 1000 milliLiter(s) (100 mL/Hr) IV Continuous <Continuous>  dextrose 5%. 1000 milliLiter(s) (50 mL/Hr) IV Continuous <Continuous>  dextrose 50% Injectable 25 Gram(s) IV Push once  dextrose 50% Injectable 12.5 Gram(s) IV Push once  DULoxetine 40 milliGRAM(s) Oral daily  glucagon  Injectable 1 milliGRAM(s) IntraMuscular once  heparin   Injectable 5000 Unit(s) SubCutaneous every 8 hours  insulin lispro (ADMELOG) corrective regimen sliding scale   SubCutaneous every 6 hours  pantoprazole    Tablet 40 milliGRAM(s) Oral before breakfast  sodium zirconium cyclosilicate 10 Gram(s) Oral once    MEDICATIONS  (PRN):  dextrose Oral Gel 15 Gram(s) Oral once PRN Blood Glucose LESS THAN 70 milliGRAM(s)/deciliter          PHYSICAL EXAM:  Vital Signs Last 24 Hrs  T(C): 36.4 (10 Jul 2024 05:45), Max: 36.8 (09 Jul 2024 19:02)  T(F): 97.5 (10 Jul 2024 05:45), Max: 98.2 (09 Jul 2024 19:02)  HR: 82 (10 Jul 2024 05:45) (62 - 86)  BP: 160/90 (10 Jul 2024 05:45) (117/86 - 178/82)  BP(mean): --  RR: 16 (10 Jul 2024 05:45) (16 - 19)  SpO2: 99% (10 Jul 2024 05:45) (95% - 100%)    Parameters below as of 10 Jul 2024 05:45  Patient On (Oxygen Delivery Method): mask, nonrebreather        I&O's Summary    08 Jul 2024 07:01  -  09 Jul 2024 07:00  --------------------------------------------------------  IN: 0 mL / OUT: 400 mL / NET: -400 mL        General: NAD, non-toxic appearing   HEENT: PERRLA, EOMi, no scleral icterus  CV: RRR, normal S1 and S2, no m/r/g  Lungs: normal respiratory effort. CTAB, no wheezes, rales, or rhonchi  Abd: soft, nontender, nondistended  Ext: no edema, 2+ peripheral pulses   Pysch: AAOx3, appropriate affect   Neuro: grossly non-focal, moving all extremities spontaneously   Skin: no rashes or lesions     LABS:  CAPILLARY BLOOD GLUCOSE      POCT Blood Glucose.: 136 mg/dL (10 Jul 2024 01:06)  POCT Blood Glucose.: 158 mg/dL (09 Jul 2024 23:08)  POCT Blood Glucose.: 158 mg/dL (09 Jul 2024 22:49)  POCT Blood Glucose.: 195 mg/dL (09 Jul 2024 18:31)  POCT Blood Glucose.: 172 mg/dL (09 Jul 2024 16:49)  POCT Blood Glucose.: 171 mg/dL (09 Jul 2024 13:12)  POCT Blood Glucose.: 181 mg/dL (09 Jul 2024 08:56)                              11.3   9.09  )-----------( 260      ( 10 Jul 2024 03:30 )             35.2       WBC Trend: 9.09<--, 8.60<--, 4.88<--  Hb Trend: 11.3<--, 11.3<--, 11.1<--    07-10    136  |  100  |  90<H>  ----------------------------<  130<H>  4.5   |  15<L>  |  4.77<H>    Ca    8.2<L>      10 Jul 2024 03:30  Phos  7.7     07-10  Mg     2.40     07-10            Urinalysis Basic - ( 10 Jul 2024 03:30 )    Color: x / Appearance: x / SG: x / pH: x  Gluc: 130 mg/dL / Ketone: x  / Bili: x / Urobili: x   Blood: x / Protein: x / Nitrite: x   Leuk Esterase: x / RBC: x / WBC x   Sq Epi: x / Non Sq Epi: x / Bacteria: x        Culture - Urine (collected 08 Jul 2024 12:50)  Source: Clean Catch Clean Catch (Midstream)  Final Report (09 Jul 2024 14:49):    No growth    Culture - Blood (collected 07 Jul 2024 21:58)  Source: .Blood Blood-Peripheral  Preliminary Report (10 Jul 2024 03:00):    No growth at 48 Hours    Culture - Blood (collected 07 Jul 2024 21:08)  Source: .Blood Blood-Peripheral  Preliminary Report (09 Jul 2024 10:01):    No growth at 24 hours          RADIOLOGY & ADDITIONAL TESTS: Reviewed Progress Note    07-08-24 (2d)    Patient is a 89y old  Female who presents with a chief complaint of COVID w/ hypoxia (09 Jul 2024 15:14)      Subjective / Overnight Events :  - No acute events overnight.  - Pt seen and examined at bedside.   - Pt with increased confusion. Consistently trying to remove telemetry leads, and removing gown. Wants to get up and leave.    ROS: unable     MEDICATIONS  (STANDING):  brimonidine 0.2% Ophthalmic Solution 1 Drop(s) Both EYES two times a day  buPROPion XL (24-Hour) . 300 milliGRAM(s) Oral daily  cefTRIAXone   IVPB 1000 milliGRAM(s) IV Intermittent every 24 hours  dexAMETHasone  Injectable 6 milliGRAM(s) IV Push daily  dextrose 10% Bolus 125 milliLiter(s) IV Bolus once  dextrose 5%. 1000 milliLiter(s) (100 mL/Hr) IV Continuous <Continuous>  dextrose 5%. 1000 milliLiter(s) (50 mL/Hr) IV Continuous <Continuous>  dextrose 50% Injectable 25 Gram(s) IV Push once  dextrose 50% Injectable 12.5 Gram(s) IV Push once  DULoxetine 40 milliGRAM(s) Oral daily  glucagon  Injectable 1 milliGRAM(s) IntraMuscular once  heparin   Injectable 5000 Unit(s) SubCutaneous every 8 hours  insulin lispro (ADMELOG) corrective regimen sliding scale   SubCutaneous every 6 hours  pantoprazole    Tablet 40 milliGRAM(s) Oral before breakfast  sodium zirconium cyclosilicate 10 Gram(s) Oral once    MEDICATIONS  (PRN):  dextrose Oral Gel 15 Gram(s) Oral once PRN Blood Glucose LESS THAN 70 milliGRAM(s)/deciliter          PHYSICAL EXAM:  Vital Signs Last 24 Hrs  T(C): 36.4 (10 Jul 2024 05:45), Max: 36.8 (09 Jul 2024 19:02)  T(F): 97.5 (10 Jul 2024 05:45), Max: 98.2 (09 Jul 2024 19:02)  HR: 82 (10 Jul 2024 05:45) (62 - 86)  BP: 160/90 (10 Jul 2024 05:45) (117/86 - 178/82)  BP(mean): --  RR: 16 (10 Jul 2024 05:45) (16 - 19)  SpO2: 99% (10 Jul 2024 05:45) (95% - 100%)    Parameters below as of 10 Jul 2024 05:45  Patient On (Oxygen Delivery Method): mask, nonrebreather        I&O's Summary    08 Jul 2024 07:01  -  09 Jul 2024 07:00  --------------------------------------------------------  IN: 0 mL / OUT: 400 mL / NET: -400 mL        General: disheveled   CV: RRR, normal S1 and S2, no m/r/g  Lungs: increased respiratory effort. CTAB, no wheezes, rales, or rhonchi  Abd: soft, nontender, nondistended  Ext: no edema   Pysch: AAOx1    LABS:  CAPILLARY BLOOD GLUCOSE      POCT Blood Glucose.: 136 mg/dL (10 Jul 2024 01:06)  POCT Blood Glucose.: 158 mg/dL (09 Jul 2024 23:08)  POCT Blood Glucose.: 158 mg/dL (09 Jul 2024 22:49)  POCT Blood Glucose.: 195 mg/dL (09 Jul 2024 18:31)  POCT Blood Glucose.: 172 mg/dL (09 Jul 2024 16:49)  POCT Blood Glucose.: 171 mg/dL (09 Jul 2024 13:12)  POCT Blood Glucose.: 181 mg/dL (09 Jul 2024 08:56)                              11.3   9.09  )-----------( 260      ( 10 Jul 2024 03:30 )             35.2       WBC Trend: 9.09<--, 8.60<--, 4.88<--  Hb Trend: 11.3<--, 11.3<--, 11.1<--    07-10    136  |  100  |  90<H>  ----------------------------<  130<H>  4.5   |  15<L>  |  4.77<H>    Ca    8.2<L>      10 Jul 2024 03:30  Phos  7.7     07-10  Mg     2.40     07-10            Urinalysis Basic - ( 10 Jul 2024 03:30 )    Color: x / Appearance: x / SG: x / pH: x  Gluc: 130 mg/dL / Ketone: x  / Bili: x / Urobili: x   Blood: x / Protein: x / Nitrite: x   Leuk Esterase: x / RBC: x / WBC x   Sq Epi: x / Non Sq Epi: x / Bacteria: x        Culture - Urine (collected 08 Jul 2024 12:50)  Source: Clean Catch Clean Catch (Midstream)  Final Report (09 Jul 2024 14:49):    No growth    Culture - Blood (collected 07 Jul 2024 21:58)  Source: .Blood Blood-Peripheral  Preliminary Report (10 Jul 2024 03:00):    No growth at 48 Hours    Culture - Blood (collected 07 Jul 2024 21:08)  Source: .Blood Blood-Peripheral  Preliminary Report (09 Jul 2024 10:01):    No growth at 24 hours          RADIOLOGY & ADDITIONAL TESTS: Reviewed Progress Note    07-08-24 (2d)    Patient is a 89y old  Female who presents with a chief complaint of COVID w/ hypoxia (09 Jul 2024 15:14)      Subjective / Overnight Events :  - No acute events overnight.  - Pt seen and examined at bedside.   - Pt with increased confusion. Consistently trying to remove telemetry leads, and removing gown.   - Pt noted to have increased respiratory effort    ROS: unable     MEDICATIONS  (STANDING):  brimonidine 0.2% Ophthalmic Solution 1 Drop(s) Both EYES two times a day  buPROPion XL (24-Hour) . 300 milliGRAM(s) Oral daily  cefTRIAXone   IVPB 1000 milliGRAM(s) IV Intermittent every 24 hours  dexAMETHasone  Injectable 6 milliGRAM(s) IV Push daily  dextrose 10% Bolus 125 milliLiter(s) IV Bolus once  dextrose 5%. 1000 milliLiter(s) (100 mL/Hr) IV Continuous <Continuous>  dextrose 5%. 1000 milliLiter(s) (50 mL/Hr) IV Continuous <Continuous>  dextrose 50% Injectable 25 Gram(s) IV Push once  dextrose 50% Injectable 12.5 Gram(s) IV Push once  DULoxetine 40 milliGRAM(s) Oral daily  glucagon  Injectable 1 milliGRAM(s) IntraMuscular once  heparin   Injectable 5000 Unit(s) SubCutaneous every 8 hours  insulin lispro (ADMELOG) corrective regimen sliding scale   SubCutaneous every 6 hours  pantoprazole    Tablet 40 milliGRAM(s) Oral before breakfast  sodium zirconium cyclosilicate 10 Gram(s) Oral once    MEDICATIONS  (PRN):  dextrose Oral Gel 15 Gram(s) Oral once PRN Blood Glucose LESS THAN 70 milliGRAM(s)/deciliter          PHYSICAL EXAM:  Vital Signs Last 24 Hrs  T(C): 36.4 (10 Jul 2024 05:45), Max: 36.8 (09 Jul 2024 19:02)  T(F): 97.5 (10 Jul 2024 05:45), Max: 98.2 (09 Jul 2024 19:02)  HR: 82 (10 Jul 2024 05:45) (62 - 86)  BP: 160/90 (10 Jul 2024 05:45) (117/86 - 178/82)  BP(mean): --  RR: 16 (10 Jul 2024 05:45) (16 - 19)  SpO2: 99% (10 Jul 2024 05:45) (95% - 100%)    Parameters below as of 10 Jul 2024 05:45  Patient On (Oxygen Delivery Method): mask, nonrebreather        I&O's Summary    08 Jul 2024 07:01  -  09 Jul 2024 07:00  --------------------------------------------------------  IN: 0 mL / OUT: 400 mL / NET: -400 mL        General: disheveled   CV: RRR, normal S1 and S2, no m/r/g  Lungs: increased respiratory effort. CTAB, no wheezes, rales, or rhonchi  Abd: soft, nontender, nondistended  Ext: no edema   Pysch: AAOx1    LABS:  CAPILLARY BLOOD GLUCOSE      POCT Blood Glucose.: 136 mg/dL (10 Jul 2024 01:06)  POCT Blood Glucose.: 158 mg/dL (09 Jul 2024 23:08)  POCT Blood Glucose.: 158 mg/dL (09 Jul 2024 22:49)  POCT Blood Glucose.: 195 mg/dL (09 Jul 2024 18:31)  POCT Blood Glucose.: 172 mg/dL (09 Jul 2024 16:49)  POCT Blood Glucose.: 171 mg/dL (09 Jul 2024 13:12)  POCT Blood Glucose.: 181 mg/dL (09 Jul 2024 08:56)                              11.3   9.09  )-----------( 260      ( 10 Jul 2024 03:30 )             35.2       WBC Trend: 9.09<--, 8.60<--, 4.88<--  Hb Trend: 11.3<--, 11.3<--, 11.1<--    07-10    136  |  100  |  90<H>  ----------------------------<  130<H>  4.5   |  15<L>  |  4.77<H>    Ca    8.2<L>      10 Jul 2024 03:30  Phos  7.7     07-10  Mg     2.40     07-10            Urinalysis Basic - ( 10 Jul 2024 03:30 )    Color: x / Appearance: x / SG: x / pH: x  Gluc: 130 mg/dL / Ketone: x  / Bili: x / Urobili: x   Blood: x / Protein: x / Nitrite: x   Leuk Esterase: x / RBC: x / WBC x   Sq Epi: x / Non Sq Epi: x / Bacteria: x        Culture - Urine (collected 08 Jul 2024 12:50)  Source: Clean Catch Clean Catch (Midstream)  Final Report (09 Jul 2024 14:49):    No growth    Culture - Blood (collected 07 Jul 2024 21:58)  Source: .Blood Blood-Peripheral  Preliminary Report (10 Jul 2024 03:00):    No growth at 48 Hours    Culture - Blood (collected 07 Jul 2024 21:08)  Source: .Blood Blood-Peripheral  Preliminary Report (09 Jul 2024 10:01):    No growth at 24 hours          RADIOLOGY & ADDITIONAL TESTS: Reviewed

## 2024-07-11 ENCOUNTER — TRANSCRIPTION ENCOUNTER (OUTPATIENT)
Age: 89
End: 2024-07-11

## 2024-07-11 LAB
ADD ON TEST-SPECIMEN IN LAB: SIGNIFICANT CHANGE UP
ANION GAP SERPL CALC-SCNC: 13 MMOL/L — SIGNIFICANT CHANGE UP (ref 7–14)
ANION GAP SERPL CALC-SCNC: 16 MMOL/L — HIGH (ref 7–14)
BASE EXCESS BLDV CALC-SCNC: -1.8 MMOL/L — SIGNIFICANT CHANGE UP (ref -2–3)
BLOOD GAS VENOUS COMPREHENSIVE RESULT: SIGNIFICANT CHANGE UP
BUN SERPL-MCNC: 60 MG/DL — HIGH (ref 7–23)
BUN SERPL-MCNC: 70 MG/DL — HIGH (ref 7–23)
CALCIUM SERPL-MCNC: 9.2 MG/DL — SIGNIFICANT CHANGE UP (ref 8.4–10.5)
CALCIUM SERPL-MCNC: 9.7 MG/DL — SIGNIFICANT CHANGE UP (ref 8.4–10.5)
CHLORIDE BLDV-SCNC: 107 MMOL/L — SIGNIFICANT CHANGE UP (ref 96–108)
CHLORIDE SERPL-SCNC: 106 MMOL/L — SIGNIFICANT CHANGE UP (ref 98–107)
CHLORIDE SERPL-SCNC: 106 MMOL/L — SIGNIFICANT CHANGE UP (ref 98–107)
CO2 BLDV-SCNC: 25 MMOL/L — SIGNIFICANT CHANGE UP (ref 22–26)
CO2 SERPL-SCNC: 20 MMOL/L — LOW (ref 22–31)
CO2 SERPL-SCNC: 22 MMOL/L — SIGNIFICANT CHANGE UP (ref 22–31)
CREAT SERPL-MCNC: 1.72 MG/DL — HIGH (ref 0.5–1.3)
CREAT SERPL-MCNC: 2.14 MG/DL — HIGH (ref 0.5–1.3)
EGFR: 21 ML/MIN/1.73M2 — LOW
EGFR: 27 ML/MIN/1.73M2 — LOW
GAS PNL BLDV: 137 MMOL/L — SIGNIFICANT CHANGE UP (ref 136–145)
GLUCOSE BLDC GLUCOMTR-MCNC: 122 MG/DL — HIGH (ref 70–99)
GLUCOSE BLDC GLUCOMTR-MCNC: 141 MG/DL — HIGH (ref 70–99)
GLUCOSE BLDC GLUCOMTR-MCNC: 174 MG/DL — HIGH (ref 70–99)
GLUCOSE BLDC GLUCOMTR-MCNC: 282 MG/DL — HIGH (ref 70–99)
GLUCOSE BLDC GLUCOMTR-MCNC: 290 MG/DL — HIGH (ref 70–99)
GLUCOSE BLDV-MCNC: 156 MG/DL — HIGH (ref 70–99)
GLUCOSE SERPL-MCNC: 150 MG/DL — HIGH (ref 70–99)
GLUCOSE SERPL-MCNC: 169 MG/DL — HIGH (ref 70–99)
HCO3 BLDV-SCNC: 24 MMOL/L — SIGNIFICANT CHANGE UP (ref 22–29)
HCT VFR BLD CALC: 41 % — SIGNIFICANT CHANGE UP (ref 34.5–45)
HCT VFR BLDA CALC: 39 % — SIGNIFICANT CHANGE UP (ref 34.5–46.5)
HGB BLD CALC-MCNC: 13 G/DL — SIGNIFICANT CHANGE UP (ref 11.7–16.1)
HGB BLD-MCNC: 13 G/DL — SIGNIFICANT CHANGE UP (ref 11.5–15.5)
LACTATE BLDV-MCNC: 1.9 MMOL/L — SIGNIFICANT CHANGE UP (ref 0.5–2)
MAGNESIUM SERPL-MCNC: 2.1 MG/DL — SIGNIFICANT CHANGE UP (ref 1.6–2.6)
MAGNESIUM SERPL-MCNC: 2.2 MG/DL — SIGNIFICANT CHANGE UP (ref 1.6–2.6)
MCHC RBC-ENTMCNC: 26.5 PG — LOW (ref 27–34)
MCHC RBC-ENTMCNC: 31.7 GM/DL — LOW (ref 32–36)
MCV RBC AUTO: 83.5 FL — SIGNIFICANT CHANGE UP (ref 80–100)
NRBC # BLD: 0 /100 WBCS — SIGNIFICANT CHANGE UP (ref 0–0)
NRBC # FLD: 0 K/UL — SIGNIFICANT CHANGE UP (ref 0–0)
PCO2 BLDV: 42 MMHG — SIGNIFICANT CHANGE UP (ref 39–52)
PH BLDV: 7.36 — SIGNIFICANT CHANGE UP (ref 7.32–7.43)
PHOSPHATE SERPL-MCNC: 3.4 MG/DL — SIGNIFICANT CHANGE UP (ref 2.5–4.5)
PHOSPHATE SERPL-MCNC: 4.2 MG/DL — SIGNIFICANT CHANGE UP (ref 2.5–4.5)
PLATELET # BLD AUTO: 253 K/UL — SIGNIFICANT CHANGE UP (ref 150–400)
PO2 BLDV: 141 MMHG — HIGH (ref 25–45)
POTASSIUM BLDV-SCNC: 4 MMOL/L — SIGNIFICANT CHANGE UP (ref 3.5–5.1)
POTASSIUM SERPL-MCNC: 4.2 MMOL/L — SIGNIFICANT CHANGE UP (ref 3.5–5.3)
POTASSIUM SERPL-MCNC: 4.5 MMOL/L — SIGNIFICANT CHANGE UP (ref 3.5–5.3)
POTASSIUM SERPL-SCNC: 4.2 MMOL/L — SIGNIFICANT CHANGE UP (ref 3.5–5.3)
POTASSIUM SERPL-SCNC: 4.5 MMOL/L — SIGNIFICANT CHANGE UP (ref 3.5–5.3)
RBC # BLD: 4.91 M/UL — SIGNIFICANT CHANGE UP (ref 3.8–5.2)
RBC # FLD: 15.5 % — HIGH (ref 10.3–14.5)
SAO2 % BLDV: 97.5 % — HIGH (ref 67–88)
SODIUM SERPL-SCNC: 141 MMOL/L — SIGNIFICANT CHANGE UP (ref 135–145)
SODIUM SERPL-SCNC: 142 MMOL/L — SIGNIFICANT CHANGE UP (ref 135–145)
T4 AB SER-ACNC: 7.34 UG/DL — SIGNIFICANT CHANGE UP (ref 5.1–13)
TSH SERPL-MCNC: 0.37 UIU/ML — SIGNIFICANT CHANGE UP (ref 0.27–4.2)
WBC # BLD: 9.19 K/UL — SIGNIFICANT CHANGE UP (ref 3.8–10.5)
WBC # FLD AUTO: 9.19 K/UL — SIGNIFICANT CHANGE UP (ref 3.8–10.5)

## 2024-07-11 PROCEDURE — 99232 SBSQ HOSP IP/OBS MODERATE 35: CPT

## 2024-07-11 PROCEDURE — 99233 SBSQ HOSP IP/OBS HIGH 50: CPT | Mod: GC

## 2024-07-11 PROCEDURE — 51702 INSERT TEMP BLADDER CATH: CPT

## 2024-07-11 RX ORDER — LABETALOL HYDROCHLORIDE 300 MG/1
5 TABLET ORAL ONCE
Refills: 0 | Status: DISCONTINUED | OUTPATIENT
Start: 2024-07-11 | End: 2024-07-11

## 2024-07-11 RX ORDER — HYDRALAZINE HYDROCHLORIDE 50 MG/1
10 TABLET ORAL ONCE
Refills: 0 | Status: COMPLETED | OUTPATIENT
Start: 2024-07-11 | End: 2024-07-11

## 2024-07-11 RX ORDER — HYDRALAZINE HYDROCHLORIDE 50 MG/1
100 TABLET ORAL
Refills: 0 | Status: DISCONTINUED | OUTPATIENT
Start: 2024-07-11 | End: 2024-07-11

## 2024-07-11 RX ORDER — ACETAMINOPHEN 325 MG
1000 TABLET ORAL ONCE
Refills: 0 | Status: COMPLETED | OUTPATIENT
Start: 2024-07-11 | End: 2024-07-11

## 2024-07-11 RX ORDER — HYDRALAZINE HYDROCHLORIDE 50 MG/1
50 TABLET ORAL THREE TIMES A DAY
Refills: 0 | Status: DISCONTINUED | OUTPATIENT
Start: 2024-07-11 | End: 2024-07-12

## 2024-07-11 RX ORDER — CARVEDILOL PHOSPHATE 80 MG/1
25 CAPSULE, EXTENDED RELEASE ORAL EVERY 12 HOURS
Refills: 0 | Status: DISCONTINUED | OUTPATIENT
Start: 2024-07-11 | End: 2024-07-11

## 2024-07-11 RX ORDER — LABETALOL HYDROCHLORIDE 300 MG/1
10 TABLET ORAL ONCE
Refills: 0 | Status: COMPLETED | OUTPATIENT
Start: 2024-07-11 | End: 2024-07-11

## 2024-07-11 RX ORDER — CARVEDILOL PHOSPHATE 80 MG/1
25 CAPSULE, EXTENDED RELEASE ORAL EVERY 12 HOURS
Refills: 0 | Status: DISCONTINUED | OUTPATIENT
Start: 2024-07-11 | End: 2024-07-17

## 2024-07-11 RX ORDER — ACETAMINOPHEN 325 MG
650 TABLET ORAL EVERY 6 HOURS
Refills: 0 | Status: DISCONTINUED | OUTPATIENT
Start: 2024-07-11 | End: 2024-07-17

## 2024-07-11 RX ORDER — BUMETANIDE 0.25 MG/ML
2 INJECTION INTRAMUSCULAR; INTRAVENOUS ONCE
Refills: 0 | Status: DISCONTINUED | OUTPATIENT
Start: 2024-07-11 | End: 2024-07-11

## 2024-07-11 RX ORDER — LABETALOL HYDROCHLORIDE 300 MG/1
5 TABLET ORAL ONCE
Refills: 0 | Status: COMPLETED | OUTPATIENT
Start: 2024-07-11 | End: 2024-07-11

## 2024-07-11 RX ORDER — LABETALOL HYDROCHLORIDE 300 MG/1
10 TABLET ORAL ONCE
Refills: 0 | Status: DISCONTINUED | OUTPATIENT
Start: 2024-07-11 | End: 2024-07-11

## 2024-07-11 RX ADMIN — INSULIN LISPRO 6: 100 INJECTION, SOLUTION SUBCUTANEOUS at 12:36

## 2024-07-11 RX ADMIN — HEPARIN SODIUM 5000 UNIT(S): 50 INJECTION, SOLUTION INTRAVENOUS at 06:10

## 2024-07-11 RX ADMIN — LABETALOL HYDROCHLORIDE 5 MILLIGRAM(S): 300 TABLET ORAL at 11:43

## 2024-07-11 RX ADMIN — Medication 1 APPLICATION(S): at 22:39

## 2024-07-11 RX ADMIN — INSULIN LISPRO 2: 100 INJECTION, SOLUTION SUBCUTANEOUS at 17:43

## 2024-07-11 RX ADMIN — Medication 400 MILLIGRAM(S): at 16:36

## 2024-07-11 RX ADMIN — LABETALOL HYDROCHLORIDE 10 MILLIGRAM(S): 300 TABLET ORAL at 14:55

## 2024-07-11 RX ADMIN — Medication 400 MILLIGRAM(S): at 02:51

## 2024-07-11 RX ADMIN — HYDRALAZINE HYDROCHLORIDE 50 MILLIGRAM(S): 50 TABLET ORAL at 17:41

## 2024-07-11 RX ADMIN — BRIMONIDINE TARTRATE 1 DROP(S): 1.5 SOLUTION/ DROPS OPHTHALMIC at 17:39

## 2024-07-11 RX ADMIN — DULOXETINE HYDROCHLORIDE 40 MILLIGRAM(S): 20 CAPSULE, DELAYED RELEASE ORAL at 14:55

## 2024-07-11 RX ADMIN — HYDRALAZINE HYDROCHLORIDE 10 MILLIGRAM(S): 50 TABLET ORAL at 07:06

## 2024-07-11 RX ADMIN — LABETALOL HYDROCHLORIDE 5 MILLIGRAM(S): 300 TABLET ORAL at 09:58

## 2024-07-11 RX ADMIN — CARVEDILOL PHOSPHATE 25 MILLIGRAM(S): 80 CAPSULE, EXTENDED RELEASE ORAL at 14:55

## 2024-07-11 RX ADMIN — Medication 100 MILLIGRAM(S): at 09:01

## 2024-07-11 RX ADMIN — BRIMONIDINE TARTRATE 1 DROP(S): 1.5 SOLUTION/ DROPS OPHTHALMIC at 06:09

## 2024-07-11 RX ADMIN — HYDRALAZINE HYDROCHLORIDE 50 MILLIGRAM(S): 50 TABLET ORAL at 22:13

## 2024-07-11 RX ADMIN — Medication 1000 MILLIGRAM(S): at 03:51

## 2024-07-11 RX ADMIN — HYDRALAZINE HYDROCHLORIDE 10 MILLIGRAM(S): 50 TABLET ORAL at 02:39

## 2024-07-11 RX ADMIN — BUPROPION HYDROCHLORIDE 300 MILLIGRAM(S): 150 TABLET, EXTENDED RELEASE ORAL at 14:55

## 2024-07-11 RX ADMIN — DEXAMETHASONE 6 MILLIGRAM(S): 1 TABLET ORAL at 06:09

## 2024-07-11 RX ADMIN — Medication 1000 MILLIGRAM(S): at 17:30

## 2024-07-11 RX ADMIN — HYDRALAZINE HYDROCHLORIDE 10 MILLIGRAM(S): 50 TABLET ORAL at 17:28

## 2024-07-11 RX ADMIN — PANTOPRAZOLE SODIUM 40 MILLIGRAM(S): 40 INJECTION, POWDER, FOR SOLUTION INTRAVENOUS at 06:10

## 2024-07-11 NOTE — PROGRESS NOTE ADULT - PROBLEM SELECTOR PLAN 1
- Pt hypoxic at home low 80s, in ED given nonrebreather now satting 95% on 10L  - COVID positive  - CXR and CT Chest (7/7) demonstrate lower lobe atlectasis with pleural effusion, cannot exclude PNA  - Procalcitonin elevated  - D-dimer elevated, cannot exclude resp fail with PE  2/2 covid hypercoaguable state  - Bedside POCUS (7/10): no evidence of fluid overload  - venous duplex LE DVT (7/10): neg  -proBNP elevated: 4741    Plan:  - Pt not candidate for remdesevir given poor renal function  - c/w decadron 6mg IV starting 7/8 (9 days). Pt given decadron 10mg on 7/7.  - c/w IV ceftriaxone 1g. elevated procal.  - transition to NC  - f/u V/Q scan   - f/u TTE  - f/u CXR - Pt hypoxic at home low 80s, in ED given nonrebreather now satting 95% on 10L  - COVID positive  - CXR and CT Chest (7/7) demonstrate lower lobe atlectasis with pleural effusion, cannot exclude PNA.   - CXR (7/10): small layering pleural effusion  - Procalcitonin elevated  - D-dimer elevated, cannot exclude resp fail with PE  2/2 covid hypercoaguable state  - Bedside POCUS (7/10): no evidence of fluid overload  - venous duplex LE DVT (7/10): neg  -proBNP elevated: 4741    Plan:  - Pt not candidate for remdesevir given poor renal function  - c/w decadron 6mg IV starting 7/8 (9 days). Pt given decadron 10mg on 7/7. Consider d/c on 7/12.   - c/w IV ceftriaxone 1g. elevated procal.  - c/w NC 6L  - hold V/Q scan, patient cannot tolerate lying flat   - f/u TTE

## 2024-07-11 NOTE — PROGRESS NOTE ADULT - PROBLEM SELECTOR PLAN 8
Pt family reports history of GI bleed on A/C  Pt family declines A/C during this admission, understands risks benefits of hypercoagulable state during COVID    Plan:  - c/w omeprazole 40mg  - hold heparin at this time

## 2024-07-11 NOTE — PROGRESS NOTE ADULT - PROBLEM SELECTOR PLAN 2
Pt presented with urinary retention x 24h  BUN/Cr elevated to 51/3.57, worsening to 73/4.5  CT shows Indeterminant 1.8 cm right renal lesion (7/7)  Contreras placed 7/8, dark yellow urine. Mental status improved  U/A positive for small amounts of bilirubin, protein 100, trace LEC. Negative bacteria, ketones, blood, WBC, RBC, b  FENa 2.8%, suggestive of Intrinsic etiology  Hyperkalemic to 6.3 --> 2 rounds of lokelma, bicarb, insulin. K+ now 5.0  Hypocalcemia --> s/p calcium gluconate  Oligura --> patient urine output 400mL in 24h  -renal U/S (7/10): right renal cysts. no hydronephrosis. bladder only with contreras.    Plan:  -nephro following  -hold Bumex IV  -LR 100ml/hr  -consider sevelamer pending regular feeds  -continue to monitor BID BMP for K+, BUN/Cr. Ca and Phos levels Pt presented with urinary retention x 24h  BUN/Cr elevated to 51/3.57, worsening to 73/4.5  CT shows Indeterminant 1.8 cm right renal lesion (7/7)  Contreras placed 7/8, dark yellow urine. Mental status improved  U/A positive for small amounts of bilirubin, protein 100, trace LEC. Negative bacteria, ketones, blood, WBC, RBC, b  FENa 2.8%, suggestive of Intrinsic etiology  Hyperkalemic to 6.3 --> 2 rounds of lokelma, bicarb, insulin. K+ now 5.0  Hypocalcemia --> s/p calcium gluconate  Oligura --> patient urine output 400mL in 24h  renal U/S (7/10): right renal cysts. no hydronephrosis. bladder only with contreras.    Plan:  -nephro following  -hold Bumex IV  -hold fluids  -replace contreras, strict I/Os  -continue to monitor BID BMP for K+, BUN/Cr. Ca and Phos levels

## 2024-07-11 NOTE — PROVIDER CONTACT NOTE (OTHER) - ACTION/TREATMENT ORDERED:
ABRAHAN Paidlla aware. IV hydralazine ordered. Care continues ABRAHAN Padilla aware. IV hydralazine and IV tylenol ordered. Care continues

## 2024-07-11 NOTE — PROVIDER CONTACT NOTE (OTHER) - RECOMMENDATIONS
NF Randolph Padilla aware. NF came and assessed patient at bedside. IV hydralazine ordered and administered. Care continues

## 2024-07-11 NOTE — CHART NOTE - NSCHARTNOTEFT_GEN_A_CORE
Pt removed contreras overnight. 3 nurses attempted multiple times to place 14Fr catheter, unsuccessful. Urology consulted to assist with contreras placement.

## 2024-07-11 NOTE — PROGRESS NOTE ADULT - SUBJECTIVE AND OBJECTIVE BOX
Progress Note    07-08-24 (3d)    Patient is a 89y old  Female who presents with a chief complaint of COVID w/ hypoxia (10 Jul 2024 13:35)      Subjective / Overnight Events :  - No acute events overnight.  - Pt seen and examined at bedside.     Additional ROS (if any):    MEDICATIONS  (STANDING):  brimonidine 0.2% Ophthalmic Solution 1 Drop(s) Both EYES two times a day  buPROPion XL (24-Hour) . 300 milliGRAM(s) Oral daily  cefTRIAXone   IVPB 1000 milliGRAM(s) IV Intermittent every 24 hours  dexAMETHasone  Injectable 6 milliGRAM(s) IV Push daily  dextrose 10% Bolus 125 milliLiter(s) IV Bolus once  dextrose 5%. 1000 milliLiter(s) (50 mL/Hr) IV Continuous <Continuous>  dextrose 5%. 1000 milliLiter(s) (100 mL/Hr) IV Continuous <Continuous>  dextrose 50% Injectable 12.5 Gram(s) IV Push once  dextrose 50% Injectable 25 Gram(s) IV Push once  DULoxetine 40 milliGRAM(s) Oral daily  glucagon  Injectable 1 milliGRAM(s) IntraMuscular once  heparin   Injectable 5000 Unit(s) SubCutaneous every 8 hours  hydrALAZINE Injectable 10 milliGRAM(s) IV Push once  insulin lispro (ADMELOG) corrective regimen sliding scale   SubCutaneous every 6 hours  melatonin 5 milliGRAM(s) Oral at bedtime  pantoprazole    Tablet 40 milliGRAM(s) Oral before breakfast    MEDICATIONS  (PRN):  dextrose Oral Gel 15 Gram(s) Oral once PRN Blood Glucose LESS THAN 70 milliGRAM(s)/deciliter          PHYSICAL EXAM:  Vital Signs Last 24 Hrs  T(C): 36.6 (11 Jul 2024 02:10), Max: 37.2 (10 Jul 2024 09:45)  T(F): 97.9 (11 Jul 2024 02:10), Max: 98.9 (10 Jul 2024 09:45)  HR: 81 (11 Jul 2024 02:10) (81 - 91)  BP: 208/108 (11 Jul 2024 02:10) (156/87 - 208/108)  BP(mean): --  RR: 18 (11 Jul 2024 02:10) (18 - 20)  SpO2: 99% (11 Jul 2024 02:10) (96% - 99%)    Parameters below as of 11 Jul 2024 02:10  Patient On (Oxygen Delivery Method): nasal cannula  O2 Flow (L/min): 6      I&O's Summary    09 Jul 2024 07:01  -  10 Jul 2024 07:00  --------------------------------------------------------  IN: 120 mL / OUT: 300 mL / NET: -180 mL    10 Jul 2024 07:01  -  11 Jul 2024 06:44  --------------------------------------------------------  IN: 700 mL / OUT: 2075 mL / NET: -1375 mL        General: NAD, non-toxic appearing   HEENT: PERRLA, EOMi, no scleral icterus  CV: RRR, normal S1 and S2, no m/r/g  Lungs: normal respiratory effort. CTAB, no wheezes, rales, or rhonchi  Abd: soft, nontender, nondistended  Ext: no edema, 2+ peripheral pulses   Pysch: AAOx3, appropriate affect   Neuro: grossly non-focal, moving all extremities spontaneously   Skin: no rashes or lesions     LABS:  CAPILLARY BLOOD GLUCOSE      POCT Blood Glucose.: 127 mg/dL (10 Jul 2024 22:21)  POCT Blood Glucose.: 231 mg/dL (10 Jul 2024 17:22)  POCT Blood Glucose.: 193 mg/dL (10 Jul 2024 12:12)  POCT Blood Glucose.: 135 mg/dL (10 Jul 2024 06:56)                              11.3   9.09  )-----------( 260      ( 10 Jul 2024 03:30 )             35.2       WBC Trend: 9.09<--, 8.60<--, 4.88<--  Hb Trend: 11.3<--, 11.3<--, 11.1<--    07-10    141  |  105  |  85<H>  ----------------------------<  138<H>  4.3   |  18<L>  |  3.14<H>    Ca    9.1      10 Jul 2024 20:58  Phos  5.9     07-10  Mg     2.40     07-10            Urinalysis Basic - ( 10 Jul 2024 20:58 )    Color: x / Appearance: x / SG: x / pH: x  Gluc: 138 mg/dL / Ketone: x  / Bili: x / Urobili: x   Blood: x / Protein: x / Nitrite: x   Leuk Esterase: x / RBC: x / WBC x   Sq Epi: x / Non Sq Epi: x / Bacteria: x        Culture - Urine (collected 08 Jul 2024 12:50)  Source: Clean Catch Clean Catch (Midstream)  Final Report (09 Jul 2024 14:49):    No growth      Blood Gas Venous Comprehensive Result: Performed In Lab (07-10-24 @ 11:02)      RADIOLOGY & ADDITIONAL TESTS: Reviewed Progress Note    07-08-24 (3d)    Patient is a 89y old  Female who presents with a chief complaint of COVID w/ hypoxia (10 Jul 2024 13:35)      Subjective / Overnight Events :  Overnight:  - Pt became agitated and removed contreras, bag had 875ml.  - Pt became hypertensive to 229/125, received labetalol x 1, hydralazine x 3. Pt did not have any cp, abd pain, headache, or increasing O2 requirements.    Subjective:  - Pt denies any pain, feels some shortness of breath. Says she is voiding on her own.    Additional ROS (if any):    MEDICATIONS  (STANDING):  brimonidine 0.2% Ophthalmic Solution 1 Drop(s) Both EYES two times a day  buPROPion XL (24-Hour) . 300 milliGRAM(s) Oral daily  cefTRIAXone   IVPB 1000 milliGRAM(s) IV Intermittent every 24 hours  dexAMETHasone  Injectable 6 milliGRAM(s) IV Push daily  dextrose 10% Bolus 125 milliLiter(s) IV Bolus once  dextrose 5%. 1000 milliLiter(s) (50 mL/Hr) IV Continuous <Continuous>  dextrose 5%. 1000 milliLiter(s) (100 mL/Hr) IV Continuous <Continuous>  dextrose 50% Injectable 12.5 Gram(s) IV Push once  dextrose 50% Injectable 25 Gram(s) IV Push once  DULoxetine 40 milliGRAM(s) Oral daily  glucagon  Injectable 1 milliGRAM(s) IntraMuscular once  heparin   Injectable 5000 Unit(s) SubCutaneous every 8 hours  hydrALAZINE Injectable 10 milliGRAM(s) IV Push once  insulin lispro (ADMELOG) corrective regimen sliding scale   SubCutaneous every 6 hours  melatonin 5 milliGRAM(s) Oral at bedtime  pantoprazole    Tablet 40 milliGRAM(s) Oral before breakfast    MEDICATIONS  (PRN):  dextrose Oral Gel 15 Gram(s) Oral once PRN Blood Glucose LESS THAN 70 milliGRAM(s)/deciliter          PHYSICAL EXAM:  Vital Signs Last 24 Hrs  T(C): 36.6 (11 Jul 2024 02:10), Max: 37.2 (10 Jul 2024 09:45)  T(F): 97.9 (11 Jul 2024 02:10), Max: 98.9 (10 Jul 2024 09:45)  HR: 81 (11 Jul 2024 02:10) (81 - 91)  BP: 208/108 (11 Jul 2024 02:10) (156/87 - 208/108)  BP(mean): --  RR: 18 (11 Jul 2024 02:10) (18 - 20)  SpO2: 99% (11 Jul 2024 02:10) (96% - 99%)    Parameters below as of 11 Jul 2024 02:10  Patient On (Oxygen Delivery Method): nasal cannula  O2 Flow (L/min): 6      I&O's Summary    09 Jul 2024 07:01  -  10 Jul 2024 07:00  --------------------------------------------------------  IN: 120 mL / OUT: 300 mL / NET: -180 mL    10 Jul 2024 07:01  -  11 Jul 2024 06:44  --------------------------------------------------------  IN: 700 mL / OUT: 2075 mL / NET: -1375 mL        General: NAD   CV: RRR, normal S1 and S2, no m/r/g  Lungs: increased respiratory effort. CTAB, no wheezes, rales, or rhonchi  Abd: soft, nontender, nondistended  Ext: trace edema, 2+ peripheral pulses   Pysch: AAOx2    LABS:  CAPILLARY BLOOD GLUCOSE      POCT Blood Glucose.: 127 mg/dL (10 Jul 2024 22:21)  POCT Blood Glucose.: 231 mg/dL (10 Jul 2024 17:22)  POCT Blood Glucose.: 193 mg/dL (10 Jul 2024 12:12)  POCT Blood Glucose.: 135 mg/dL (10 Jul 2024 06:56)                              11.3   9.09  )-----------( 260      ( 10 Jul 2024 03:30 )             35.2       WBC Trend: 9.09<--, 8.60<--, 4.88<--  Hb Trend: 11.3<--, 11.3<--, 11.1<--    07-10    141  |  105  |  85<H>  ----------------------------<  138<H>  4.3   |  18<L>  |  3.14<H>    Ca    9.1      10 Jul 2024 20:58  Phos  5.9     07-10  Mg     2.40     07-10            Urinalysis Basic - ( 10 Jul 2024 20:58 )    Color: x / Appearance: x / SG: x / pH: x  Gluc: 138 mg/dL / Ketone: x  / Bili: x / Urobili: x   Blood: x / Protein: x / Nitrite: x   Leuk Esterase: x / RBC: x / WBC x   Sq Epi: x / Non Sq Epi: x / Bacteria: x        Culture - Urine (collected 08 Jul 2024 12:50)  Source: Clean Catch Clean Catch (Midstream)  Final Report (09 Jul 2024 14:49):    No growth      Blood Gas Venous Comprehensive Result: Performed In Lab (07-10-24 @ 11:02)      RADIOLOGY & ADDITIONAL TESTS: Reviewed

## 2024-07-11 NOTE — PROGRESS NOTE ADULT - SUBJECTIVE AND OBJECTIVE BOX
[facility] Division of Kidney Diseases & Hypertension  FOLLOW UP NOTE  [phone]--------------------------------------------------------------------------------    Chief Complaint: ANAMARIA    24 hour events/subjective: Pt. was seen and evaluated at bedside this morning. Pt. reports dyspnea and generalized weakness/fatigue. Denies any headaches, fevers/chills, chest pain, abdominal pain, and LE edema.    PAST HISTORY  --------------------------------------------------------------------------------  No significant changes to PMH, PSH, FHx, SHx, unless otherwise noted    ALLERGIES & MEDICATIONS  --------------------------------------------------------------------------------  Allergies    sulfa drugs (Unknown)  penicillins (Unknown)  iodine (Unknown)    Intolerances      Standing Inpatient Medications  brimonidine 0.2% Ophthalmic Solution 1 Drop(s) Both EYES two times a day  buPROPion XL (24-Hour) . 300 milliGRAM(s) Oral daily  carvedilol 25 milliGRAM(s) Oral every 12 hours  cefTRIAXone   IVPB 1000 milliGRAM(s) IV Intermittent every 24 hours  dexAMETHasone  Injectable 6 milliGRAM(s) IV Push daily  dextrose 10% Bolus 125 milliLiter(s) IV Bolus once  dextrose 5%. 1000 milliLiter(s) IV Continuous <Continuous>  dextrose 5%. 1000 milliLiter(s) IV Continuous <Continuous>  dextrose 50% Injectable 25 Gram(s) IV Push once  dextrose 50% Injectable 12.5 Gram(s) IV Push once  DULoxetine 40 milliGRAM(s) Oral daily  glucagon  Injectable 1 milliGRAM(s) IntraMuscular once  insulin lispro (ADMELOG) corrective regimen sliding scale   SubCutaneous every 6 hours  labetalol Injectable 5 milliGRAM(s) IV Push once  melatonin 5 milliGRAM(s) Oral at bedtime  pantoprazole    Tablet 40 milliGRAM(s) Oral before breakfast    PRN Inpatient Medications  dextrose Oral Gel 15 Gram(s) Oral once PRN      REVIEW OF SYSTEMS  --------------------------------------------------------------------------------  Gen: +Generalized weakness/fatigue  Skin: No rashes  Head/Eyes/Ears/Mouth: No headache  Respiratory: +Dyspnea  CV: No chest pain  GI: No abdominal pain  MSK: No edema  Neuro: No dizziness/lightheadednes    All other systems were reviewed and are negative, except as noted.    VITALS/PHYSICAL EXAM  --------------------------------------------------------------------------------  T(C): 36.3 (07-11-24 @ 10:00), Max: 37.1 (07-10-24 @ 13:45)  HR: 80 (07-11-24 @ 10:00) (80 - 91)  BP: 220/104 (07-11-24 @ 10:00) (156/87 - 229/125)  RR: 18 (07-11-24 @ 10:00) (18 - 20)  SpO2: 96% (07-11-24 @ 10:00) (96% - 99%)  Wt(kg): --  Height (cm): 172.7 (07-09-24 @ 23:36)  Weight (kg): 65.2 (07-09-24 @ 23:36)  BMI (kg/m2): 21.9 (07-09-24 @ 23:36)  BSA (m2): 1.78 (07-09-24 @ 23:36)      07-10-24 @ 07:01  -  07-11-24 @ 07:00  --------------------------------------------------------  IN: 700 mL / OUT: 2075 mL / NET: -1375 mL    07-11-24 @ 07:01  -  07-11-24 @ 11:16  --------------------------------------------------------  IN: 120 mL / OUT: 150 mL / NET: -30 mL    Physical Exam:  Gen: Elderly female, ill appearing  HEENT: MMM  Pulm: Decreased air entry BL  CV: S1S2  Abd: Soft, +BS   Ext: No LE edema B/L  : Urinary catheter with clear urine noted  Neuro: Awake but minimally conversive  Skin: Warm and dry  Vascular access: Peripheral IV line    LABS/STUDIES  --------------------------------------------------------------------------------              13.0   9.19  >-----------<  253      [07-11-24 @ 08:05]              41.0     142  |  106  |  70  ----------------------------<  150      [07-11-24 @ 08:05]  4.2   |  20  |  2.14        Ca     9.7     [07-11-24 @ 08:05]      Mg     2.20     [07-11-24 @ 08:05]      Phos  4.2     [07-11-24 @ 08:05]    Creatinine Trend:  SCr 2.14 [07-11 @ 08:05]  SCr 3.14 [07-10 @ 20:58]  SCr 3.98 [07-10 @ 13:37]  SCr 4.77 [07-10 @ 03:30]  SCr 4.42 [07-09 @ 15:03]    Urine Creatinine 76      [07-08-24 @ 12:50]  Urine Protein 100      [07-08-24 @ 12:50]  Urine Sodium 82      [07-08-24 @ 12:50]  Urine Urea Nitrogen 241.8      [07-08-24 @ 12:50]  Urine Potassium 57.4      [07-08-24 @ 12:50]  Urine Osmolality 394      [07-08-24 @ 14:55]

## 2024-07-11 NOTE — PROGRESS NOTE ADULT - PROBLEM SELECTOR PLAN 2
Pt. with hyperkalemia in the setting of ANAMARIA. Serum potassium WNL at 4.2 this morning. Monitor serum potassium.

## 2024-07-11 NOTE — CHART NOTE - NSCHARTNOTEFT_GEN_A_CORE
Nurse called due to BP reading of 208/108. Patient has been persistently hypertensive throughout the evening. Also complaining of 9/10 pain in vaginal/anus area, most likely due to pulling out her contreras.    Upon questioning patient, she expressed discomfort in her vaginal area. Aside from that she said she was feeling fine. She denied headache, palpitations, or other symptoms.    On physical examination, patient was resting comfortably in bed. When examining her vaginal area, there was minimal erythema or swelling noted. No evidence of bleeding.     Patient was given 10mg IV for hypertension. We also added IV tylenol due to patient's expressed pain. Perhaps addressing the pain will also help to lower her blood pressure. Will continue to followup with nurse. Nurse called due to BP reading of 208/108. Patient has been persistently hypertensive throughout the evening. Also complaining of 9/10 pain in vaginal/anus area, most likely due to pulling out her contreras.    Upon questioning patient, she expressed discomfort in her vaginal area. Aside from that she said she was feeling fine. She denied headache, palpitations, or other symptoms.    On physical examination, patient was resting comfortably in bed. When examining her vaginal area, there was minimal erythema or swelling noted. No evidence of bleeding.     Patient was given 10mg Hydralazine IV for hypertension. We also added IV tylenol due to patient's expressed pain. Perhaps addressing the pain will also help to lower her blood pressure. Will continue to followup with nurse.

## 2024-07-11 NOTE — PROGRESS NOTE ADULT - PROBLEM SELECTOR PLAN 4
BP stable 110-130/55-75 on admission  Now in high 150s/80s    Plan:  - hold home meds below  - Procardia 90mg XL, coreg 25mg BID, hydralazine 100mg BID, bumex 1mg, losartan 100mg BP stable 110-130/55-75 on admission  Now in high 200s/100s    Plan:  - started on home dose coreg 25mg BID  - hold home meds below  - Procardia 90mg XL, hydralazine 100mg BID, bumex 1mg, losartan 100mg

## 2024-07-11 NOTE — CHART NOTE - NSCHARTNOTEFT_GEN_A_CORE
Note    Called to place Difficult Contreras    Pt requires contreras for urinary retention    T(C): 36.7 (07-11-24 @ 17:25), Max: 36.9 (07-10-24 @ 21:40)  HR: 74 (07-11-24 @ 18:45) (74 - 88)  BP: 186/92 (07-11-24 @ 18:45) (171/79 - 229/125)  RR: 18 (07-11-24 @ 17:25) (18 - 18)  SpO2: 96% (07-11-24 @ 17:25) (95% - 99%)  Wt(kg): --    PE:  GEN: NAD  ABD: + suprapubic tenderness  : +edema of external genitalia    Procedure:  16f  contreras placed in aseptic fashion.  750cc of urine collected clear yellow color  pt tolerated well      Assessment/Plan  -Continue contreras until pt closer to baseline functional status (keep at least 5 days)  -Strict I & O's  -Monitor for the possibility of post obstructive diuresis

## 2024-07-11 NOTE — PROVIDER CONTACT NOTE (OTHER) - ACTION/TREATMENT ORDERED:
ABRAHAN Padilla aware. Recheck BP. Care continues. ABRAHAN Padilla aware. Recheck BP. IV labetalol ordered and administered. Care continues.

## 2024-07-12 ENCOUNTER — RESULT REVIEW (OUTPATIENT)
Age: 89
End: 2024-07-12

## 2024-07-12 LAB
ANION GAP SERPL CALC-SCNC: 14 MMOL/L — SIGNIFICANT CHANGE UP (ref 7–14)
ANION GAP SERPL CALC-SCNC: 16 MMOL/L — HIGH (ref 7–14)
BASE EXCESS BLDV CALC-SCNC: 2.6 MMOL/L — SIGNIFICANT CHANGE UP (ref -2–3)
BASE EXCESS BLDV CALC-SCNC: 2.9 MMOL/L — SIGNIFICANT CHANGE UP (ref -2–3)
BLOOD GAS VENOUS COMPREHENSIVE RESULT: SIGNIFICANT CHANGE UP
BUN SERPL-MCNC: 50 MG/DL — HIGH (ref 7–23)
BUN SERPL-MCNC: 56 MG/DL — HIGH (ref 7–23)
CA-I SERPL-SCNC: 1.21 MMOL/L — SIGNIFICANT CHANGE UP (ref 1.15–1.33)
CALCIUM SERPL-MCNC: 8.8 MG/DL — SIGNIFICANT CHANGE UP (ref 8.4–10.5)
CALCIUM SERPL-MCNC: 9.4 MG/DL — SIGNIFICANT CHANGE UP (ref 8.4–10.5)
CHLORIDE BLDV-SCNC: 104 MMOL/L — SIGNIFICANT CHANGE UP (ref 96–108)
CHLORIDE BLDV-SCNC: 108 MMOL/L — SIGNIFICANT CHANGE UP (ref 96–108)
CHLORIDE SERPL-SCNC: 102 MMOL/L — SIGNIFICANT CHANGE UP (ref 98–107)
CHLORIDE SERPL-SCNC: 105 MMOL/L — SIGNIFICANT CHANGE UP (ref 98–107)
CO2 BLDV-SCNC: 26.7 MMOL/L — HIGH (ref 22–26)
CO2 BLDV-SCNC: 27.3 MMOL/L — HIGH (ref 22–26)
CO2 SERPL-SCNC: 21 MMOL/L — LOW (ref 22–31)
CO2 SERPL-SCNC: 22 MMOL/L — SIGNIFICANT CHANGE UP (ref 22–31)
CREAT SERPL-MCNC: 1.55 MG/DL — HIGH (ref 0.5–1.3)
CREAT SERPL-MCNC: 1.55 MG/DL — HIGH (ref 0.5–1.3)
EGFR: 30 ML/MIN/1.73M2 — LOW
EGFR: 30 ML/MIN/1.73M2 — LOW
GAS PNL BLDV: 134 MMOL/L — LOW (ref 136–145)
GAS PNL BLDV: 134 MMOL/L — LOW (ref 136–145)
GAS PNL BLDV: SIGNIFICANT CHANGE UP
GLUCOSE BLDC GLUCOMTR-MCNC: 116 MG/DL — HIGH (ref 70–99)
GLUCOSE BLDC GLUCOMTR-MCNC: 135 MG/DL — HIGH (ref 70–99)
GLUCOSE BLDC GLUCOMTR-MCNC: 155 MG/DL — HIGH (ref 70–99)
GLUCOSE BLDC GLUCOMTR-MCNC: 208 MG/DL — HIGH (ref 70–99)
GLUCOSE BLDV-MCNC: 107 MG/DL — HIGH (ref 70–99)
GLUCOSE BLDV-MCNC: 189 MG/DL — HIGH (ref 70–99)
GLUCOSE SERPL-MCNC: 105 MG/DL — HIGH (ref 70–99)
GLUCOSE SERPL-MCNC: 183 MG/DL — HIGH (ref 70–99)
HCO3 BLDV-SCNC: 26 MMOL/L — SIGNIFICANT CHANGE UP (ref 22–29)
HCO3 BLDV-SCNC: 26 MMOL/L — SIGNIFICANT CHANGE UP (ref 22–29)
HCT VFR BLD CALC: 40.3 % — SIGNIFICANT CHANGE UP (ref 34.5–45)
HCT VFR BLDA CALC: 38 % — SIGNIFICANT CHANGE UP (ref 34.5–46.5)
HCT VFR BLDA CALC: 40 % — SIGNIFICANT CHANGE UP (ref 34.5–46.5)
HGB BLD CALC-MCNC: 12.8 G/DL — SIGNIFICANT CHANGE UP (ref 11.7–16.1)
HGB BLD CALC-MCNC: 13.2 G/DL — SIGNIFICANT CHANGE UP (ref 11.7–16.1)
HGB BLD-MCNC: 12.9 G/DL — SIGNIFICANT CHANGE UP (ref 11.5–15.5)
LACTATE BLDV-MCNC: 1.2 MMOL/L — SIGNIFICANT CHANGE UP (ref 0.5–2)
LACTATE BLDV-MCNC: 1.3 MMOL/L — SIGNIFICANT CHANGE UP (ref 0.5–2)
MAGNESIUM SERPL-MCNC: 2 MG/DL — SIGNIFICANT CHANGE UP (ref 1.6–2.6)
MAGNESIUM SERPL-MCNC: 2 MG/DL — SIGNIFICANT CHANGE UP (ref 1.6–2.6)
MCHC RBC-ENTMCNC: 26.2 PG — LOW (ref 27–34)
MCHC RBC-ENTMCNC: 32 GM/DL — SIGNIFICANT CHANGE UP (ref 32–36)
MCV RBC AUTO: 81.9 FL — SIGNIFICANT CHANGE UP (ref 80–100)
MRSA PCR RESULT.: SIGNIFICANT CHANGE UP
NRBC # BLD: 0 /100 WBCS — SIGNIFICANT CHANGE UP (ref 0–0)
NRBC # FLD: 0 K/UL — SIGNIFICANT CHANGE UP (ref 0–0)
PCO2 BLDV: 33 MMHG — LOW (ref 39–52)
PCO2 BLDV: 36 MMHG — LOW (ref 39–52)
PH BLDV: 7.47 — HIGH (ref 7.32–7.43)
PH BLDV: 7.5 — HIGH (ref 7.32–7.43)
PHOSPHATE SERPL-MCNC: 2.5 MG/DL — SIGNIFICANT CHANGE UP (ref 2.5–4.5)
PHOSPHATE SERPL-MCNC: 2.6 MG/DL — SIGNIFICANT CHANGE UP (ref 2.5–4.5)
PLATELET # BLD AUTO: 223 K/UL — SIGNIFICANT CHANGE UP (ref 150–400)
PO2 BLDV: 184 MMHG — HIGH (ref 25–45)
PO2 BLDV: 58 MMHG — HIGH (ref 25–45)
POTASSIUM BLDV-SCNC: 3.6 MMOL/L — SIGNIFICANT CHANGE UP (ref 3.5–5.1)
POTASSIUM BLDV-SCNC: 3.7 MMOL/L — SIGNIFICANT CHANGE UP (ref 3.5–5.1)
POTASSIUM SERPL-MCNC: 3.7 MMOL/L — SIGNIFICANT CHANGE UP (ref 3.5–5.3)
POTASSIUM SERPL-MCNC: 3.8 MMOL/L — SIGNIFICANT CHANGE UP (ref 3.5–5.3)
POTASSIUM SERPL-SCNC: 3.7 MMOL/L — SIGNIFICANT CHANGE UP (ref 3.5–5.3)
POTASSIUM SERPL-SCNC: 3.8 MMOL/L — SIGNIFICANT CHANGE UP (ref 3.5–5.3)
RBC # BLD: 4.92 M/UL — SIGNIFICANT CHANGE UP (ref 3.8–5.2)
RBC # FLD: 15.7 % — HIGH (ref 10.3–14.5)
S AUREUS DNA NOSE QL NAA+PROBE: DETECTED
SAO2 % BLDV: 89.4 % — HIGH (ref 67–88)
SAO2 % BLDV: 98.1 % — HIGH (ref 67–88)
SODIUM SERPL-SCNC: 138 MMOL/L — SIGNIFICANT CHANGE UP (ref 135–145)
SODIUM SERPL-SCNC: 142 MMOL/L — SIGNIFICANT CHANGE UP (ref 135–145)
WBC # BLD: 7.84 K/UL — SIGNIFICANT CHANGE UP (ref 3.8–10.5)
WBC # FLD AUTO: 7.84 K/UL — SIGNIFICANT CHANGE UP (ref 3.8–10.5)

## 2024-07-12 PROCEDURE — 99232 SBSQ HOSP IP/OBS MODERATE 35: CPT

## 2024-07-12 PROCEDURE — 99233 SBSQ HOSP IP/OBS HIGH 50: CPT | Mod: GC

## 2024-07-12 PROCEDURE — 93306 TTE W/DOPPLER COMPLETE: CPT | Mod: 26

## 2024-07-12 RX ORDER — HYDRALAZINE HYDROCHLORIDE 50 MG/1
10 TABLET ORAL ONCE
Refills: 0 | Status: COMPLETED | OUTPATIENT
Start: 2024-07-12 | End: 2024-07-12

## 2024-07-12 RX ORDER — SIMETHICONE 40MG/0.6ML
80 SUSPENSION, DROPS(FINAL DOSAGE FORM)(ML) ORAL EVERY 6 HOURS
Refills: 0 | Status: COMPLETED | OUTPATIENT
Start: 2024-07-12 | End: 2024-07-14

## 2024-07-12 RX ORDER — ACETAMINOPHEN 325 MG
1000 TABLET ORAL ONCE
Refills: 0 | Status: COMPLETED | OUTPATIENT
Start: 2024-07-12 | End: 2024-07-12

## 2024-07-12 RX ORDER — HYDRALAZINE HYDROCHLORIDE 50 MG/1
5 TABLET ORAL ONCE
Refills: 0 | Status: COMPLETED | OUTPATIENT
Start: 2024-07-12 | End: 2024-07-12

## 2024-07-12 RX ORDER — MUPIROCIN 20 MG/G
1 OINTMENT TOPICAL
Refills: 0 | Status: COMPLETED | OUTPATIENT
Start: 2024-07-12 | End: 2024-07-17

## 2024-07-12 RX ORDER — OXYCODONE HYDROCHLORIDE 100 MG/5ML
5 SOLUTION ORAL ONCE
Refills: 0 | Status: DISCONTINUED | OUTPATIENT
Start: 2024-07-12 | End: 2024-07-12

## 2024-07-12 RX ORDER — HYDRALAZINE HYDROCHLORIDE 50 MG/1
75 TABLET ORAL THREE TIMES A DAY
Refills: 0 | Status: DISCONTINUED | OUTPATIENT
Start: 2024-07-12 | End: 2024-07-17

## 2024-07-12 RX ORDER — HYDRALAZINE HYDROCHLORIDE 50 MG/1
50 TABLET ORAL THREE TIMES A DAY
Refills: 0 | Status: DISCONTINUED | OUTPATIENT
Start: 2024-07-12 | End: 2024-07-12

## 2024-07-12 RX ADMIN — Medication 100 MILLIGRAM(S): at 08:15

## 2024-07-12 RX ADMIN — CARVEDILOL PHOSPHATE 25 MILLIGRAM(S): 80 CAPSULE, EXTENDED RELEASE ORAL at 17:49

## 2024-07-12 RX ADMIN — Medication 1 APPLICATION(S): at 12:48

## 2024-07-12 RX ADMIN — Medication 3 MILLIGRAM(S): at 21:36

## 2024-07-12 RX ADMIN — PANTOPRAZOLE SODIUM 40 MILLIGRAM(S): 40 INJECTION, POWDER, FOR SOLUTION INTRAVENOUS at 06:01

## 2024-07-12 RX ADMIN — HYDRALAZINE HYDROCHLORIDE 50 MILLIGRAM(S): 50 TABLET ORAL at 13:43

## 2024-07-12 RX ADMIN — Medication 80 MILLIGRAM(S): at 17:58

## 2024-07-12 RX ADMIN — HYDRALAZINE HYDROCHLORIDE 10 MILLIGRAM(S): 50 TABLET ORAL at 15:18

## 2024-07-12 RX ADMIN — HYDRALAZINE HYDROCHLORIDE 5 MILLIGRAM(S): 50 TABLET ORAL at 01:51

## 2024-07-12 RX ADMIN — Medication 90 MILLIGRAM(S): at 12:30

## 2024-07-12 RX ADMIN — BRIMONIDINE TARTRATE 1 DROP(S): 1.5 SOLUTION/ DROPS OPHTHALMIC at 05:56

## 2024-07-12 RX ADMIN — BRIMONIDINE TARTRATE 1 DROP(S): 1.5 SOLUTION/ DROPS OPHTHALMIC at 17:49

## 2024-07-12 RX ADMIN — HYDRALAZINE HYDROCHLORIDE 50 MILLIGRAM(S): 50 TABLET ORAL at 06:01

## 2024-07-12 RX ADMIN — INSULIN LISPRO 4: 100 INJECTION, SOLUTION SUBCUTANEOUS at 12:03

## 2024-07-12 RX ADMIN — INSULIN LISPRO 2: 100 INJECTION, SOLUTION SUBCUTANEOUS at 17:48

## 2024-07-12 RX ADMIN — MUPIROCIN 1 APPLICATION(S): 20 OINTMENT TOPICAL at 18:39

## 2024-07-12 RX ADMIN — CARVEDILOL PHOSPHATE 25 MILLIGRAM(S): 80 CAPSULE, EXTENDED RELEASE ORAL at 06:01

## 2024-07-12 RX ADMIN — DULOXETINE HYDROCHLORIDE 40 MILLIGRAM(S): 20 CAPSULE, DELAYED RELEASE ORAL at 12:31

## 2024-07-12 RX ADMIN — BUPROPION HYDROCHLORIDE 300 MILLIGRAM(S): 150 TABLET, EXTENDED RELEASE ORAL at 12:31

## 2024-07-12 RX ADMIN — Medication 400 MILLIGRAM(S): at 15:18

## 2024-07-12 NOTE — PROVIDER CONTACT NOTE (OTHER) - REASON
/100 HR 84
/125 HR 81, manual /96
/115 HR91
/82
Patient pulled out the contreras
/108 HR81
High BP
Pt hypertensive throughout shift
family refused heparin
/77 HR88
hypertensive
/111 HR 84

## 2024-07-12 NOTE — PROVIDER CONTACT NOTE (OTHER) - RECOMMENDATIONS
Randolph Padilla aware. Hydralazine 5mg IVP ordered. Continue to monitor BP Randolph Padilla aware. Hydralazine 5mg IVP ordered and administered. Continue to monitor BP

## 2024-07-12 NOTE — PROGRESS NOTE ADULT - PROBLEM SELECTOR PLAN 2
Pt. with hyperkalemia in the setting of ANAMARIA. Serum potassium WNL at 3.7 this morning. Monitor serum potassium.

## 2024-07-12 NOTE — PROVIDER CONTACT NOTE (OTHER) - NAME OF MD/NP/PA/DO NOTIFIED:
Addended by: Arturo Davenport on: 5/25/2022 04:30 PM     Modules accepted: Orders
ABRAHAN Padilla
Ruiz Calderon
ABRAHAN Padilla
ABRAHAN Padilla
Randolph Padilla
Randolph Padilla
md Ya
Ruiz Calderon
ABRAHAN Padilla
GILBERT Morrison
melody meyer
Randolph Padilla

## 2024-07-12 NOTE — PROGRESS NOTE ADULT - PROBLEM SELECTOR PLAN 1
Pt. with ANAMARIA in the setting of COVID-19 infection. On review of [place], SCr was WNL at 0.84 on 10/3/2015. Per review of lab results provided by family at bedside, SCr remained WNL at 0.81 on outpatient labs performed on 1/30/2024. SCr initially during this admission was elevated at 3.28 (7/7). Pt. received IVFs and Bumex 1 mg IV once. SCr peaked at 4.77 on 7/10. Pt. received IVFs and IV Bumex on 7/10. SCr subsequently decreased to 1.55 today (7/12). Pt. is non-oliguric, documented UOP is ~1.3L over the past 24 hours. UA showed proteinuria. UPCR elevated at 1.3. Kidney sonogram showed non-obstructing L renal calculus but no evidence of hydronephrosis. Pt. with likely ATN. Monitor labs and urine output. Avoid nephrotoxins. Dose medications as per eGFR.

## 2024-07-12 NOTE — PROGRESS NOTE ADULT - PROBLEM SELECTOR PLAN 2
Pt presented with urinary retention x 24h  BUN/Cr elevated to 51/3.57, worsening to 73/4.5  CT shows Indeterminant 1.8 cm right renal lesion (7/7)  Contreras placed 7/8, dark yellow urine. Mental status improved  U/A positive for small amounts of bilirubin, protein 100, trace LEC. Negative bacteria, ketones, blood, WBC, RBC, b  FENa 2.8%, suggestive of Intrinsic etiology  Hyperkalemic to 6.3 --> 2 rounds of lokelma, bicarb, insulin. K+ now 5.0  Hypocalcemia --> s/p calcium gluconate  Oligura --> patient urine output 400mL in 24h  renal U/S (7/10): right renal cysts. no hydronephrosis. bladder only with contreras.    Plan:  -nephro following  -hold Bumex IV  -hold fluids  -replace contreras, strict I/Os  -continue to monitor BID BMP for K+, BUN/Cr. Ca and Phos levels Pt presented with urinary retention x 24h  BUN/Cr elevated to 51/3.57, worsening to 73/4.5  CT shows Indeterminant 1.8 cm right renal lesion (7/7)  Contreras placed 7/8, dark yellow urine. Mental status improved  U/A positive for small amounts of bilirubin, protein 100, trace LEC. Negative bacteria, ketones, blood, WBC, RBC, b  FENa 2.8%, suggestive of Intrinsic etiology  Hyperkalemic to 6.3 --> 2 rounds of lokelma, bicarb, insulin. K+ now 5.0  Hypocalcemia --> s/p calcium gluconate  Oligura --> patient urine output 400mL in 24h  renal U/S (7/10): right renal cysts. no hydronephrosis. bladder only with contreras.    Plan:  -nephro following  -hold Bumex IV  -hold fluids  -c/w contreras, strict I/Os  -continue to monitor BID BMP for K+, BUN/Cr. Ca and Phos levels

## 2024-07-12 NOTE — PROVIDER CONTACT NOTE (OTHER) - BACKGROUND
Pt admitted for COVID
Pt has history of hypertension
Patient admited for severe acute respiratory syndrome due to covid
Patient admitted for acute respitory syndrome covid. PMHx of hypertension
Patient admited for severe acute respiratory syndrome due to covid
Pt has history of hypertension
Patient admited for severe acute respiratory syndrome due to covid
Patient admited for severe acute respiratory syndrome due to covid
Pt has history of hypertension

## 2024-07-12 NOTE — PROGRESS NOTE ADULT - SUBJECTIVE AND OBJECTIVE BOX
[facility] Division of Kidney Diseases & Hypertension  FOLLOW UP NOTE  [phone]--------------------------------------------------------------------------------    Chief Complaint: ANAMARIA    24 hour events/subjective: Pt. was seen and evaluated at bedside this morning. Pt. reports improvement in dyspnea. Pt. continues to have generalized weakness/fatigue. Denies any headaches, fevers/chills, chest pain, abdominal pain, and LE edema.      PAST HISTORY  --------------------------------------------------------------------------------  No significant changes to PMH, PSH, FHx, SHx, unless otherwise noted    ALLERGIES & MEDICATIONS  --------------------------------------------------------------------------------  Allergies    sulfa drugs (Unknown)  penicillins (Unknown)  iodine (Unknown)    Intolerances    Standing Inpatient Medications  brimonidine 0.2% Ophthalmic Solution 1 Drop(s) Both EYES two times a day  buPROPion XL (24-Hour) . 300 milliGRAM(s) Oral daily  carvedilol 25 milliGRAM(s) Oral every 12 hours  cefTRIAXone   IVPB 1000 milliGRAM(s) IV Intermittent every 24 hours  chlorhexidine 2% Cloths 1 Application(s) Topical daily  dextrose 10% Bolus 125 milliLiter(s) IV Bolus once  dextrose 5%. 1000 milliLiter(s) IV Continuous <Continuous>  dextrose 5%. 1000 milliLiter(s) IV Continuous <Continuous>  dextrose 50% Injectable 12.5 Gram(s) IV Push once  dextrose 50% Injectable 25 Gram(s) IV Push once  DULoxetine 40 milliGRAM(s) Oral daily  glucagon  Injectable 1 milliGRAM(s) IntraMuscular once  hydrALAZINE 50 milliGRAM(s) Oral three times a day  insulin lispro (ADMELOG) corrective regimen sliding scale   SubCutaneous every 6 hours  melatonin 3 milliGRAM(s) Oral at bedtime  NIFEdipine XL 90 milliGRAM(s) Oral daily  oxyCODONE    IR 5 milliGRAM(s) Oral once  pantoprazole    Tablet 40 milliGRAM(s) Oral before breakfast    PRN Inpatient Medications  acetaminophen     Tablet .. 650 milliGRAM(s) Oral every 6 hours PRN  dextrose Oral Gel 15 Gram(s) Oral once PRN      REVIEW OF SYSTEMS  --------------------------------------------------------------------------------  Gen: +Generalized weakness/fatigue  Skin: No rashes  Head/Eyes/Ears/Mouth: No headache  Respiratory: +Dyspnea (resolved)  CV: No chest pain  GI: No abdominal pain  MSK: No edema  Neuro: No dizziness/lightheadednes    All other systems were reviewed and are negative, except as noted.    VITALS/PHYSICAL EXAM  --------------------------------------------------------------------------------  T(C): 36.4 (07-12-24 @ 10:47), Max: 36.7 (07-11-24 @ 17:25)  HR: 86 (07-12-24 @ 10:47) (74 - 95)  BP: 219/123 (07-12-24 @ 10:47) (186/92 - 230/115)  RR: 18 (07-12-24 @ 10:47) (17 - 18)  SpO2: 100% (07-12-24 @ 10:47) (95% - 100%)  Wt(kg): --    07-11-24 @ 07:01  -  07-12-24 @ 07:00  --------------------------------------------------------  IN: 560 mL / OUT: 1250 mL / NET: -690 mL    Physical Exam:  Gen: Elderly female, ill appearing  HEENT: MMM  Pulm: Decreased air entry BL  CV: S1S2  Abd: Soft, +BS   Ext: No LE edema B/L  : Urinary catheter with clear urine noted  Neuro: Awake but minimally conversive  Skin: Warm and dry  Vascular access: Peripheral IV line    LABS/STUDIES  --------------------------------------------------------------------------------              12.9   7.84  >-----------<  223      [07-12-24 @ 04:10]              40.3     142  |  105  |  56  ----------------------------<  105      [07-12-24 @ 04:10]  3.7   |  21  |  1.55        Ca     9.4     [07-12-24 @ 04:10]      Mg     2.00     [07-12-24 @ 04:10]      Phos  2.6     [07-12-24 @ 04:10]    Creatinine Trend:  SCr 1.55 [07-12 @ 04:10]  SCr 1.72 [07-11 @ 17:29]  SCr 2.14 [07-11 @ 08:05]  SCr 3.14 [07-10 @ 20:58]  SCr 3.98 [07-10 @ 13:37]    Urine Creatinine 76      [07-08-24 @ 12:50]  Urine Protein 100      [07-08-24 @ 12:50]  Urine Sodium 82      [07-08-24 @ 12:50]  Urine Urea Nitrogen 241.8      [07-08-24 @ 12:50]  Urine Potassium 57.4      [07-08-24 @ 12:50]  Urine Osmolality 394      [07-08-24 @ 14:55]    TSH 0.37      [07-11-24 @ 17:29]

## 2024-07-12 NOTE — PROVIDER CONTACT NOTE (OTHER) - SITUATION
/115 HR91
hypertensive
/123    HR 86
Pt hypertensive throughout shift
/77 HR88
family refused heparin
/111 HR 84, other vitals stable.
/125 HR 81, manual /96
/82
Patient pulled out the contreras
/100 HR 84
/108 HR81, patient complained pain 9/10 at rectal and vagina

## 2024-07-12 NOTE — PROVIDER CONTACT NOTE (OTHER) - DATE AND TIME:
10-Jul-2024 17:03
11-Jul-2024 02:13
10-Jul-2024 19:54
12-Jul-2024 10:47
11-Jul-2024 18:00
12-Jul-2024 01:31
12-Jul-2024 06:24
10-Jul-2024 03:49
11-Jul-2024 06:14
10-Jul-2024 22:40
10-Jul-2024 22:46
11-Jul-2024 23:50

## 2024-07-12 NOTE — PROGRESS NOTE ADULT - SUBJECTIVE AND OBJECTIVE BOX
Progress Note    07-08-24 (4d)    Patient is a 89y old  Female who presents with a chief complaint of COVID w/ hypoxia (11 Jul 2024 11:16)      Subjective / Overnight Events :  - No acute events overnight.  - Pt seen and examined at bedside.     Additional ROS (if any):    MEDICATIONS  (STANDING):  brimonidine 0.2% Ophthalmic Solution 1 Drop(s) Both EYES two times a day  buPROPion XL (24-Hour) . 300 milliGRAM(s) Oral daily  carvedilol 25 milliGRAM(s) Oral every 12 hours  cefTRIAXone   IVPB 1000 milliGRAM(s) IV Intermittent every 24 hours  chlorhexidine 2% Cloths 1 Application(s) Topical daily  dextrose 10% Bolus 125 milliLiter(s) IV Bolus once  dextrose 5%. 1000 milliLiter(s) (50 mL/Hr) IV Continuous <Continuous>  dextrose 5%. 1000 milliLiter(s) (100 mL/Hr) IV Continuous <Continuous>  dextrose 50% Injectable 12.5 Gram(s) IV Push once  dextrose 50% Injectable 25 Gram(s) IV Push once  DULoxetine 40 milliGRAM(s) Oral daily  glucagon  Injectable 1 milliGRAM(s) IntraMuscular once  hydrALAZINE 50 milliGRAM(s) Oral three times a day  insulin lispro (ADMELOG) corrective regimen sliding scale   SubCutaneous every 6 hours  melatonin 3 milliGRAM(s) Oral at bedtime  pantoprazole    Tablet 40 milliGRAM(s) Oral before breakfast    MEDICATIONS  (PRN):  acetaminophen     Tablet .. 650 milliGRAM(s) Oral every 6 hours PRN Temp greater or equal to 38.5C (101.3F), Mild Pain (1 - 3), Moderate Pain (4 - 6)  dextrose Oral Gel 15 Gram(s) Oral once PRN Blood Glucose LESS THAN 70 milliGRAM(s)/deciliter          PHYSICAL EXAM:  Vital Signs Last 24 Hrs  T(C): 36.4 (12 Jul 2024 05:30), Max: 36.7 (11 Jul 2024 17:25)  T(F): 97.5 (12 Jul 2024 05:30), Max: 98.1 (12 Jul 2024 01:41)  HR: 91 (12 Jul 2024 05:30) (74 - 91)  BP: 230/115 (12 Jul 2024 05:30) (186/92 - 230/115)  BP(mean): --  RR: 17 (12 Jul 2024 05:30) (17 - 18)  SpO2: 98% (12 Jul 2024 05:30) (95% - 98%)    Parameters below as of 12 Jul 2024 05:30  Patient On (Oxygen Delivery Method): room air        I&O's Summary    10 Jul 2024 07:01  -  11 Jul 2024 07:00  --------------------------------------------------------  IN: 700 mL / OUT: 2075 mL / NET: -1375 mL    11 Jul 2024 07:01  -  12 Jul 2024 06:41  --------------------------------------------------------  IN: 560 mL / OUT: 1250 mL / NET: -690 mL        General: NAD, non-toxic appearing   HEENT: PERRLA, EOMi, no scleral icterus  CV: RRR, normal S1 and S2, no m/r/g  Lungs: normal respiratory effort. CTAB, no wheezes, rales, or rhonchi  Abd: soft, nontender, nondistended  Ext: no edema, 2+ peripheral pulses   Pysch: AAOx3, appropriate affect   Neuro: grossly non-focal, moving all extremities spontaneously   Skin: no rashes or lesions     LABS:  CAPILLARY BLOOD GLUCOSE      POCT Blood Glucose.: 135 mg/dL (12 Jul 2024 06:20)  POCT Blood Glucose.: 116 mg/dL (12 Jul 2024 00:39)  POCT Blood Glucose.: 122 mg/dL (11 Jul 2024 22:15)  POCT Blood Glucose.: 174 mg/dL (11 Jul 2024 16:54)  POCT Blood Glucose.: 290 mg/dL (11 Jul 2024 12:30)  POCT Blood Glucose.: 282 mg/dL (11 Jul 2024 12:18)  POCT Blood Glucose.: 141 mg/dL (11 Jul 2024 07:01)                              12.9   7.84  )-----------( 223      ( 12 Jul 2024 04:10 )             40.3       WBC Trend: 7.84<--, 9.19<--, 9.09<--  Hb Trend: 12.9<--, 13.0<--, 11.3<--, 11.3<--, 11.1<--    07-12    142  |  105  |  56<H>  ----------------------------<  105<H>  3.7   |  21<L>  |  1.55<H>    Ca    9.4      12 Jul 2024 04:10  Phos  2.6     07-12  Mg     2.00     07-12            Urinalysis Basic - ( 12 Jul 2024 04:10 )    Color: x / Appearance: x / SG: x / pH: x  Gluc: 105 mg/dL / Ketone: x  / Bili: x / Urobili: x   Blood: x / Protein: x / Nitrite: x   Leuk Esterase: x / RBC: x / WBC x   Sq Epi: x / Non Sq Epi: x / Bacteria: x        Blood Gas Venous Comprehensive Result: Performed In Lab (07-12-24 @ 04:10)  Blood Gas Venous Comprehensive Result: Performed In Lab (07-11-24 @ 08:05)      RADIOLOGY & ADDITIONAL TESTS: Reviewed Progress Note    07-08-24 (4d)    Patient is a 89y old  Female who presents with a chief complaint of COVID w/ hypoxia (11 Jul 2024 11:16)      Subjective / Overnight Events :  Overnight:  - Pt continued to be hypertensive to 230/115. Given IV hydralazine 5mg, and her home doses of 50mg hydralazine pO    Subjective  - Pt seen and examined at bedside.   - Pt feels some shortness of breath. Otherwise no cp, abd pain, leg pain.    Additional ROS (if any):    MEDICATIONS  (STANDING):  brimonidine 0.2% Ophthalmic Solution 1 Drop(s) Both EYES two times a day  buPROPion XL (24-Hour) . 300 milliGRAM(s) Oral daily  carvedilol 25 milliGRAM(s) Oral every 12 hours  cefTRIAXone   IVPB 1000 milliGRAM(s) IV Intermittent every 24 hours  chlorhexidine 2% Cloths 1 Application(s) Topical daily  dextrose 10% Bolus 125 milliLiter(s) IV Bolus once  dextrose 5%. 1000 milliLiter(s) (50 mL/Hr) IV Continuous <Continuous>  dextrose 5%. 1000 milliLiter(s) (100 mL/Hr) IV Continuous <Continuous>  dextrose 50% Injectable 12.5 Gram(s) IV Push once  dextrose 50% Injectable 25 Gram(s) IV Push once  DULoxetine 40 milliGRAM(s) Oral daily  glucagon  Injectable 1 milliGRAM(s) IntraMuscular once  hydrALAZINE 50 milliGRAM(s) Oral three times a day  insulin lispro (ADMELOG) corrective regimen sliding scale   SubCutaneous every 6 hours  melatonin 3 milliGRAM(s) Oral at bedtime  pantoprazole    Tablet 40 milliGRAM(s) Oral before breakfast    MEDICATIONS  (PRN):  acetaminophen     Tablet .. 650 milliGRAM(s) Oral every 6 hours PRN Temp greater or equal to 38.5C (101.3F), Mild Pain (1 - 3), Moderate Pain (4 - 6)  dextrose Oral Gel 15 Gram(s) Oral once PRN Blood Glucose LESS THAN 70 milliGRAM(s)/deciliter          PHYSICAL EXAM:  Vital Signs Last 24 Hrs  T(C): 36.4 (12 Jul 2024 05:30), Max: 36.7 (11 Jul 2024 17:25)  T(F): 97.5 (12 Jul 2024 05:30), Max: 98.1 (12 Jul 2024 01:41)  HR: 91 (12 Jul 2024 05:30) (74 - 91)  BP: 230/115 (12 Jul 2024 05:30) (186/92 - 230/115)  BP(mean): --  RR: 17 (12 Jul 2024 05:30) (17 - 18)  SpO2: 98% (12 Jul 2024 05:30) (95% - 98%)    Parameters below as of 12 Jul 2024 05:30  Patient On (Oxygen Delivery Method): room air        I&O's Summary    10 Jul 2024 07:01  -  11 Jul 2024 07:00  --------------------------------------------------------  IN: 700 mL / OUT: 2075 mL / NET: -1375 mL    11 Jul 2024 07:01  -  12 Jul 2024 06:41  --------------------------------------------------------  IN: 560 mL / OUT: 1250 mL / NET: -690 mL        General: NAD  CV: RRR, normal S1 and S2, no m/r/g  Lungs: increased respiratory effort. CTAB, no wheezes, rales, or rhonchi  Abd: soft, nontender, nondistended  Ext: no edema   Pysch: AAOx2    LABS:  CAPILLARY BLOOD GLUCOSE      POCT Blood Glucose.: 135 mg/dL (12 Jul 2024 06:20)  POCT Blood Glucose.: 116 mg/dL (12 Jul 2024 00:39)  POCT Blood Glucose.: 122 mg/dL (11 Jul 2024 22:15)  POCT Blood Glucose.: 174 mg/dL (11 Jul 2024 16:54)  POCT Blood Glucose.: 290 mg/dL (11 Jul 2024 12:30)  POCT Blood Glucose.: 282 mg/dL (11 Jul 2024 12:18)  POCT Blood Glucose.: 141 mg/dL (11 Jul 2024 07:01)                              12.9   7.84  )-----------( 223      ( 12 Jul 2024 04:10 )             40.3       WBC Trend: 7.84<--, 9.19<--, 9.09<--  Hb Trend: 12.9<--, 13.0<--, 11.3<--, 11.3<--, 11.1<--    07-12    142  |  105  |  56<H>  ----------------------------<  105<H>  3.7   |  21<L>  |  1.55<H>    Ca    9.4      12 Jul 2024 04:10  Phos  2.6     07-12  Mg     2.00     07-12            Urinalysis Basic - ( 12 Jul 2024 04:10 )    Color: x / Appearance: x / SG: x / pH: x  Gluc: 105 mg/dL / Ketone: x  / Bili: x / Urobili: x   Blood: x / Protein: x / Nitrite: x   Leuk Esterase: x / RBC: x / WBC x   Sq Epi: x / Non Sq Epi: x / Bacteria: x        Blood Gas Venous Comprehensive Result: Performed In Lab (07-12-24 @ 04:10)  Blood Gas Venous Comprehensive Result: Performed In Lab (07-11-24 @ 08:05)      RADIOLOGY & ADDITIONAL TESTS: Reviewed

## 2024-07-12 NOTE — PROGRESS NOTE ADULT - PROBLEM SELECTOR PLAN 4
BP stable 110-130/55-75 on admission  Now in high 200s/100s    Plan:  - started on home dose coreg 25mg BID  - hold home meds below  - Procardia 90mg XL, hydralazine 100mg BID, bumex 1mg, losartan 100mg altered GI function; frequent partial bowel obstruction/other (specify) BP stable 110-130/55-75 on admission  Now in high 200s/100s    Plan:  - c/w coreg 25mg BID  - start on 50mg Hydralazine TID  - start on Nifedipine 90mg XL qd  - hold home meds below bumex 1mg, losartan 100mg BP stable 110-130/55-75 on admission  Now in high 200s/100s  Hold precautions for 170s/80s    Plan:  - c/w coreg 25mg BID  - c/w 50mg Hydralazine TID  - start on Nifedipine 90mg XL qd  - one time dose oxycodone 5mg for pain mgmt  - hold home meds below bumex 1mg, losartan 100mg BP stable 110-130/55-75 on admission  Now in high 200s/100s  Hold precautions for 170s/80s    Plan:  - c/w coreg 25mg BID  - started 75mg Hydralazine TID  - start on Nifedipine 90mg XL qd  - one time dose oxycodone 5mg for pain mgmt  - hold home meds below bumex 1mg, losartan 100mg

## 2024-07-12 NOTE — PROVIDER CONTACT NOTE (OTHER) - ASSESSMENT
Pt appeared in no distress
VS documented in flowsheet. Pt asymptomatic.
/111 HR 84, other vitals stable. Patient resting comfortably in bed, denies having pain or any other discomforts.
hypertensive. bp 194/95. all other vitals stable. see flowsheet. no s/s of distress.
/77 HR88
family refused heparin. as per family pt has h/o gi bleed. education provided.
/108 HR81, patient complained pain 9/10 at rectal and vagina. Patient denies having headache
/115 HR91. Patient asymptomatic.  patient denies headache or other discomfort
/77 HR88
Patient A&Ox1, restless and frequently touching and pulling lines and tubings with her hands. When repositioning the patient on the bed, RN noticed the contreras ballon was out and blood tinged noticed on the diaper.
Pt appeared in no distress

## 2024-07-12 NOTE — PROGRESS NOTE ADULT - PROBLEM SELECTOR PLAN 1
- Pt hypoxic at home low 80s, in ED given nonrebreather now satting 95% on 10L  - COVID positive  - CXR and CT Chest (7/7) demonstrate lower lobe atlectasis with pleural effusion, cannot exclude PNA.   - CXR (7/10): small layering pleural effusion  - Procalcitonin elevated  - D-dimer elevated, cannot exclude resp fail with PE  2/2 covid hypercoaguable state  - Bedside POCUS (7/10): no evidence of fluid overload  - venous duplex LE DVT (7/10): neg  -proBNP elevated: 4741    Plan:  - Pt not candidate for remdesevir given poor renal function  - c/w decadron 6mg IV starting 7/8 (9 days). Pt given decadron 10mg on 7/7. Consider d/c on 7/12.   - c/w IV ceftriaxone 1g. elevated procal.  - c/w NC 6L  - hold V/Q scan, patient cannot tolerate lying flat   - f/u TTE - Pt hypoxic at home low 80s, in ED given nonrebreather now satting 95% on 10L  - COVID positive  - CXR and CT Chest (7/7) demonstrate lower lobe atlectasis with pleural effusion, cannot exclude PNA.   - CXR (7/10): small layering pleural effusion  - Procalcitonin elevated  - D-dimer elevated, cannot exclude resp fail with PE  2/2 covid hypercoaguable state  - Bedside POCUS (7/10): no evidence of fluid overload  - venous duplex LE DVT (7/10): neg  -proBNP elevated: 4741    Plan:  - Pt not candidate for remdesevir given poor renal function  - d/c'ed decadron 6mg IV (7/8-7/11). Pt given decadron 10mg on 7/7   - c/w IV ceftriaxone 1g. elevated procal. (day 4)  - on venturi mask __ L  - hold V/Q scan, patient cannot tolerate lying flat   - f/u TTE - Pt hypoxic at home low 80s, in ED given nonrebreather now satting 95% on 10L  - COVID positive  - CXR and CT Chest (7/7) demonstrate lower lobe atlectasis with pleural effusion, cannot exclude PNA.   - CXR (7/10): small layering pleural effusion  - Procalcitonin elevated  - D-dimer elevated, cannot exclude resp fail with PE  2/2 covid hypercoaguable state  - Bedside POCUS (7/10): no evidence of fluid overload  - venous duplex LE DVT (7/10): neg  -proBNP elevated: 4741    Plan:  - Pt not candidate for remdesevir given poor renal function  - d/c'ed decadron 6mg IV (7/8-7/11). Pt given decadron 10mg on 7/7   - c/w IV ceftriaxone 1g. elevated procal. (day 4)  - on venturi mask 3 L  - hold V/Q scan, patient cannot tolerate lying flat   - f/u TTE

## 2024-07-13 LAB
ALBUMIN SERPL ELPH-MCNC: 3.3 G/DL — SIGNIFICANT CHANGE UP (ref 3.3–5)
ALP SERPL-CCNC: 65 U/L — SIGNIFICANT CHANGE UP (ref 40–120)
ALT FLD-CCNC: 17 U/L — SIGNIFICANT CHANGE UP (ref 4–33)
ANION GAP SERPL CALC-SCNC: 15 MMOL/L — HIGH (ref 7–14)
AST SERPL-CCNC: 19 U/L — SIGNIFICANT CHANGE UP (ref 4–32)
BILIRUB SERPL-MCNC: 0.4 MG/DL — SIGNIFICANT CHANGE UP (ref 0.2–1.2)
BLOOD GAS VENOUS COMPREHENSIVE RESULT: SIGNIFICANT CHANGE UP
BUN SERPL-MCNC: 48 MG/DL — HIGH (ref 7–23)
CALCIUM SERPL-MCNC: 8.9 MG/DL — SIGNIFICANT CHANGE UP (ref 8.4–10.5)
CHLORIDE SERPL-SCNC: 101 MMOL/L — SIGNIFICANT CHANGE UP (ref 98–107)
CO2 SERPL-SCNC: 22 MMOL/L — SIGNIFICANT CHANGE UP (ref 22–31)
CREAT SERPL-MCNC: 1.44 MG/DL — HIGH (ref 0.5–1.3)
CULTURE RESULTS: SIGNIFICANT CHANGE UP
CULTURE RESULTS: SIGNIFICANT CHANGE UP
EGFR: 33 ML/MIN/1.73M2 — LOW
GLUCOSE BLDC GLUCOMTR-MCNC: 124 MG/DL — HIGH (ref 70–99)
GLUCOSE BLDC GLUCOMTR-MCNC: 128 MG/DL — HIGH (ref 70–99)
GLUCOSE BLDC GLUCOMTR-MCNC: 128 MG/DL — HIGH (ref 70–99)
GLUCOSE BLDC GLUCOMTR-MCNC: 224 MG/DL — HIGH (ref 70–99)
GLUCOSE SERPL-MCNC: 111 MG/DL — HIGH (ref 70–99)
HCT VFR BLD CALC: 38.9 % — SIGNIFICANT CHANGE UP (ref 34.5–45)
HGB BLD-MCNC: 12.2 G/DL — SIGNIFICANT CHANGE UP (ref 11.5–15.5)
MAGNESIUM SERPL-MCNC: 2 MG/DL — SIGNIFICANT CHANGE UP (ref 1.6–2.6)
MCHC RBC-ENTMCNC: 26.1 PG — LOW (ref 27–34)
MCHC RBC-ENTMCNC: 31.4 GM/DL — LOW (ref 32–36)
MCV RBC AUTO: 83.3 FL — SIGNIFICANT CHANGE UP (ref 80–100)
NRBC # BLD: 0 /100 WBCS — SIGNIFICANT CHANGE UP (ref 0–0)
NRBC # FLD: 0 K/UL — SIGNIFICANT CHANGE UP (ref 0–0)
PHOSPHATE SERPL-MCNC: 2.5 MG/DL — SIGNIFICANT CHANGE UP (ref 2.5–4.5)
PLATELET # BLD AUTO: 218 K/UL — SIGNIFICANT CHANGE UP (ref 150–400)
POTASSIUM SERPL-MCNC: 3.5 MMOL/L — SIGNIFICANT CHANGE UP (ref 3.5–5.3)
POTASSIUM SERPL-SCNC: 3.5 MMOL/L — SIGNIFICANT CHANGE UP (ref 3.5–5.3)
PROT SERPL-MCNC: 6.1 G/DL — SIGNIFICANT CHANGE UP (ref 6–8.3)
RBC # BLD: 4.67 M/UL — SIGNIFICANT CHANGE UP (ref 3.8–5.2)
RBC # FLD: 15.9 % — HIGH (ref 10.3–14.5)
SODIUM SERPL-SCNC: 138 MMOL/L — SIGNIFICANT CHANGE UP (ref 135–145)
SPECIMEN SOURCE: SIGNIFICANT CHANGE UP
SPECIMEN SOURCE: SIGNIFICANT CHANGE UP
WBC # BLD: 7.06 K/UL — SIGNIFICANT CHANGE UP (ref 3.8–10.5)
WBC # FLD AUTO: 7.06 K/UL — SIGNIFICANT CHANGE UP (ref 3.8–10.5)

## 2024-07-13 PROCEDURE — 99233 SBSQ HOSP IP/OBS HIGH 50: CPT | Mod: GC

## 2024-07-13 RX ORDER — SENNOSIDES 8.6 MG
2 TABLET ORAL AT BEDTIME
Refills: 0 | Status: DISCONTINUED | OUTPATIENT
Start: 2024-07-13 | End: 2024-07-17

## 2024-07-13 RX ORDER — POLYETHYLENE GLYCOL 3350 1 G/G
17 POWDER ORAL DAILY
Refills: 0 | Status: DISCONTINUED | OUTPATIENT
Start: 2024-07-13 | End: 2024-07-17

## 2024-07-13 RX ADMIN — MUPIROCIN 1 APPLICATION(S): 20 OINTMENT TOPICAL at 05:48

## 2024-07-13 RX ADMIN — Medication 80 MILLIGRAM(S): at 05:46

## 2024-07-13 RX ADMIN — Medication 100 MILLIGRAM(S): at 09:15

## 2024-07-13 RX ADMIN — HYDRALAZINE HYDROCHLORIDE 75 MILLIGRAM(S): 50 TABLET ORAL at 05:59

## 2024-07-13 RX ADMIN — BRIMONIDINE TARTRATE 1 DROP(S): 1.5 SOLUTION/ DROPS OPHTHALMIC at 06:01

## 2024-07-13 RX ADMIN — INSULIN LISPRO 4: 100 INJECTION, SOLUTION SUBCUTANEOUS at 12:11

## 2024-07-13 RX ADMIN — POLYETHYLENE GLYCOL 3350 17 GRAM(S): 1 POWDER ORAL at 17:29

## 2024-07-13 RX ADMIN — BRIMONIDINE TARTRATE 1 DROP(S): 1.5 SOLUTION/ DROPS OPHTHALMIC at 17:27

## 2024-07-13 RX ADMIN — Medication 80 MILLIGRAM(S): at 12:13

## 2024-07-13 RX ADMIN — MUPIROCIN 1 APPLICATION(S): 20 OINTMENT TOPICAL at 18:17

## 2024-07-13 RX ADMIN — Medication 650 MILLIGRAM(S): at 22:05

## 2024-07-13 RX ADMIN — CARVEDILOL PHOSPHATE 25 MILLIGRAM(S): 80 CAPSULE, EXTENDED RELEASE ORAL at 17:30

## 2024-07-13 RX ADMIN — Medication 2 TABLET(S): at 21:54

## 2024-07-13 RX ADMIN — CARVEDILOL PHOSPHATE 25 MILLIGRAM(S): 80 CAPSULE, EXTENDED RELEASE ORAL at 05:46

## 2024-07-13 RX ADMIN — Medication 1 APPLICATION(S): at 12:14

## 2024-07-13 RX ADMIN — HYDRALAZINE HYDROCHLORIDE 75 MILLIGRAM(S): 50 TABLET ORAL at 21:53

## 2024-07-13 RX ADMIN — PANTOPRAZOLE SODIUM 40 MILLIGRAM(S): 40 INJECTION, POWDER, FOR SOLUTION INTRAVENOUS at 05:46

## 2024-07-13 RX ADMIN — HYDRALAZINE HYDROCHLORIDE 75 MILLIGRAM(S): 50 TABLET ORAL at 13:49

## 2024-07-13 RX ADMIN — DULOXETINE HYDROCHLORIDE 40 MILLIGRAM(S): 20 CAPSULE, DELAYED RELEASE ORAL at 12:14

## 2024-07-13 RX ADMIN — Medication 90 MILLIGRAM(S): at 05:47

## 2024-07-13 RX ADMIN — BUPROPION HYDROCHLORIDE 300 MILLIGRAM(S): 150 TABLET, EXTENDED RELEASE ORAL at 12:14

## 2024-07-13 RX ADMIN — Medication 3 MILLIGRAM(S): at 21:54

## 2024-07-13 RX ADMIN — Medication 80 MILLIGRAM(S): at 17:29

## 2024-07-13 NOTE — PROGRESS NOTE ADULT - PROBLEM SELECTOR PLAN 4
BP stable 110-130/55-75 on admission  Now in high 200s/100s  Hold precautions for 170s/80s    Plan:  - c/w coreg 25mg BID  - started 75mg Hydralazine TID  - start on Nifedipine 90mg XL qd  - one time dose oxycodone 5mg for pain mgmt  - hold home meds below bumex 1mg, losartan 100mg BP stable 110-130/55-75 on admission  Now in high 200s/100s  Hold precautions for 170s/80s    Plan:  - c/w coreg 25mg BID  - c/w 75mg Hydralazine TID  - c/w Nifedipine 90mg XL qd  - IV tylenol for pain  - family declined oxycodone, bad experience in the past  - hold home meds below bumex 1mg, losartan 100mg

## 2024-07-13 NOTE — PROGRESS NOTE ADULT - SUBJECTIVE AND OBJECTIVE BOX
Progress Note    07-08-24 (5d)    Patient is a 89y old  Female who presents with a chief complaint of COVID w/ hypoxia (12 Jul 2024 12:33)      Subjective / Overnight Events :  - No acute events overnight.  - Pt seen and examined at bedside.     Additional ROS (if any):    MEDICATIONS  (STANDING):  brimonidine 0.2% Ophthalmic Solution 1 Drop(s) Both EYES two times a day  buPROPion XL (24-Hour) . 300 milliGRAM(s) Oral daily  carvedilol 25 milliGRAM(s) Oral every 12 hours  cefTRIAXone   IVPB 1000 milliGRAM(s) IV Intermittent every 24 hours  chlorhexidine 2% Cloths 1 Application(s) Topical daily  dextrose 10% Bolus 125 milliLiter(s) IV Bolus once  dextrose 5%. 1000 milliLiter(s) (50 mL/Hr) IV Continuous <Continuous>  dextrose 5%. 1000 milliLiter(s) (100 mL/Hr) IV Continuous <Continuous>  dextrose 50% Injectable 25 Gram(s) IV Push once  dextrose 50% Injectable 12.5 Gram(s) IV Push once  DULoxetine 40 milliGRAM(s) Oral daily  glucagon  Injectable 1 milliGRAM(s) IntraMuscular once  hydrALAZINE 75 milliGRAM(s) Oral three times a day  insulin lispro (ADMELOG) corrective regimen sliding scale   SubCutaneous every 6 hours  melatonin 3 milliGRAM(s) Oral at bedtime  mupirocin 2% Ointment 1 Application(s) Both Nostrils two times a day  NIFEdipine XL 90 milliGRAM(s) Oral daily  pantoprazole    Tablet 40 milliGRAM(s) Oral before breakfast  simethicone 80 milliGRAM(s) Chew every 6 hours    MEDICATIONS  (PRN):  acetaminophen     Tablet .. 650 milliGRAM(s) Oral every 6 hours PRN Temp greater or equal to 38.5C (101.3F), Mild Pain (1 - 3), Moderate Pain (4 - 6)  dextrose Oral Gel 15 Gram(s) Oral once PRN Blood Glucose LESS THAN 70 milliGRAM(s)/deciliter          PHYSICAL EXAM:  Vital Signs Last 24 Hrs  T(C): 36.7 (13 Jul 2024 01:30), Max: 36.7 (12 Jul 2024 17:30)  T(F): 98.1 (13 Jul 2024 01:30), Max: 98.1 (12 Jul 2024 17:30)  HR: 89 (13 Jul 2024 01:30) (78 - 95)  BP: 173/75 (13 Jul 2024 01:30) (173/75 - 219/123)  BP(mean): --  RR: 18 (13 Jul 2024 01:30) (17 - 18)  SpO2: 98% (13 Jul 2024 01:30) (97% - 100%)    Parameters below as of 13 Jul 2024 01:30  Patient On (Oxygen Delivery Method): room air        I&O's Summary    11 Jul 2024 07:01  -  12 Jul 2024 07:00  --------------------------------------------------------  IN: 560 mL / OUT: 1250 mL / NET: -690 mL    12 Jul 2024 07:01  -  13 Jul 2024 06:41  --------------------------------------------------------  IN: 360 mL / OUT: 0 mL / NET: 360 mL        General: NAD, non-toxic appearing   HEENT: PERRLA, EOMi, no scleral icterus  CV: RRR, normal S1 and S2, no m/r/g  Lungs: normal respiratory effort. CTAB, no wheezes, rales, or rhonchi  Abd: soft, nontender, nondistended  Ext: no edema, 2+ peripheral pulses   Pysch: AAOx3, appropriate affect   Neuro: grossly non-focal, moving all extremities spontaneously   Skin: no rashes or lesions     LABS:  CAPILLARY BLOOD GLUCOSE      POCT Blood Glucose.: 124 mg/dL (13 Jul 2024 06:06)  POCT Blood Glucose.: 128 mg/dL (13 Jul 2024 00:19)  POCT Blood Glucose.: 155 mg/dL (12 Jul 2024 17:45)  POCT Blood Glucose.: 208 mg/dL (12 Jul 2024 11:15)                              12.2   7.06  )-----------( 218      ( 13 Jul 2024 03:57 )             38.9       WBC Trend: 7.06<--, 7.84<--, 9.19<--  Hb Trend: 12.2<--, 12.9<--, 13.0<--, 11.3<--, 11.3<--    07-13    138  |  101  |  48<H>  ----------------------------<  111<H>  3.5   |  22  |  1.44<H>    Ca    8.9      13 Jul 2024 03:57  Phos  2.5     07-13  Mg     2.00     07-13    TPro  6.1  /  Alb  3.3  /  TBili  0.4  /  DBili  x   /  AST  19  /  ALT  17  /  AlkPhos  65  07-13          Urinalysis Basic - ( 13 Jul 2024 03:57 )    Color: x / Appearance: x / SG: x / pH: x  Gluc: 111 mg/dL / Ketone: x  / Bili: x / Urobili: x   Blood: x / Protein: x / Nitrite: x   Leuk Esterase: x / RBC: x / WBC x   Sq Epi: x / Non Sq Epi: x / Bacteria: x        Blood Gas Venous Comprehensive Result: Performed In Lab (07-13-24 @ 03:57)      RADIOLOGY & ADDITIONAL TESTS: Reviewed Progress Note    07-08-24 (5d)    Patient is a 89y old  Female who presents with a chief complaint of COVID w/ hypoxia (12 Jul 2024 12:33)      Subjective / Overnight Events :  - No acute events overnight.  - Pt seen and examined at bedside.   - Pt feeling better, says she has good appetite and some sob. Denies any cp, abd pain, dysuria.    Additional ROS (if any):    MEDICATIONS  (STANDING):  brimonidine 0.2% Ophthalmic Solution 1 Drop(s) Both EYES two times a day  buPROPion XL (24-Hour) . 300 milliGRAM(s) Oral daily  carvedilol 25 milliGRAM(s) Oral every 12 hours  cefTRIAXone   IVPB 1000 milliGRAM(s) IV Intermittent every 24 hours  chlorhexidine 2% Cloths 1 Application(s) Topical daily  dextrose 10% Bolus 125 milliLiter(s) IV Bolus once  dextrose 5%. 1000 milliLiter(s) (50 mL/Hr) IV Continuous <Continuous>  dextrose 5%. 1000 milliLiter(s) (100 mL/Hr) IV Continuous <Continuous>  dextrose 50% Injectable 25 Gram(s) IV Push once  dextrose 50% Injectable 12.5 Gram(s) IV Push once  DULoxetine 40 milliGRAM(s) Oral daily  glucagon  Injectable 1 milliGRAM(s) IntraMuscular once  hydrALAZINE 75 milliGRAM(s) Oral three times a day  insulin lispro (ADMELOG) corrective regimen sliding scale   SubCutaneous every 6 hours  melatonin 3 milliGRAM(s) Oral at bedtime  mupirocin 2% Ointment 1 Application(s) Both Nostrils two times a day  NIFEdipine XL 90 milliGRAM(s) Oral daily  pantoprazole    Tablet 40 milliGRAM(s) Oral before breakfast  simethicone 80 milliGRAM(s) Chew every 6 hours    MEDICATIONS  (PRN):  acetaminophen     Tablet .. 650 milliGRAM(s) Oral every 6 hours PRN Temp greater or equal to 38.5C (101.3F), Mild Pain (1 - 3), Moderate Pain (4 - 6)  dextrose Oral Gel 15 Gram(s) Oral once PRN Blood Glucose LESS THAN 70 milliGRAM(s)/deciliter          PHYSICAL EXAM:  Vital Signs Last 24 Hrs  T(C): 36.7 (13 Jul 2024 01:30), Max: 36.7 (12 Jul 2024 17:30)  T(F): 98.1 (13 Jul 2024 01:30), Max: 98.1 (12 Jul 2024 17:30)  HR: 89 (13 Jul 2024 01:30) (78 - 95)  BP: 173/75 (13 Jul 2024 01:30) (173/75 - 219/123)  BP(mean): --  RR: 18 (13 Jul 2024 01:30) (17 - 18)  SpO2: 98% (13 Jul 2024 01:30) (97% - 100%)    Parameters below as of 13 Jul 2024 01:30  Patient On (Oxygen Delivery Method): room air        I&O's Summary    11 Jul 2024 07:01  -  12 Jul 2024 07:00  --------------------------------------------------------  IN: 560 mL / OUT: 1250 mL / NET: -690 mL    12 Jul 2024 07:01  -  13 Jul 2024 06:41  --------------------------------------------------------  IN: 360 mL / OUT: 0 mL / NET: 360 mL        General: NAD  CV: RRR, normal S1 and S2, no m/r/g  Lungs: normal respiratory effort. CTAB, no wheezes, rales, or rhonchi  Abd: soft, nontender, nondistended  Ext: no edema  Pysch: AAOx2     LABS:  CAPILLARY BLOOD GLUCOSE      POCT Blood Glucose.: 124 mg/dL (13 Jul 2024 06:06)  POCT Blood Glucose.: 128 mg/dL (13 Jul 2024 00:19)  POCT Blood Glucose.: 155 mg/dL (12 Jul 2024 17:45)  POCT Blood Glucose.: 208 mg/dL (12 Jul 2024 11:15)                              12.2   7.06  )-----------( 218      ( 13 Jul 2024 03:57 )             38.9       WBC Trend: 7.06<--, 7.84<--, 9.19<--  Hb Trend: 12.2<--, 12.9<--, 13.0<--, 11.3<--, 11.3<--    07-13    138  |  101  |  48<H>  ----------------------------<  111<H>  3.5   |  22  |  1.44<H>    Ca    8.9      13 Jul 2024 03:57  Phos  2.5     07-13  Mg     2.00     07-13    TPro  6.1  /  Alb  3.3  /  TBili  0.4  /  DBili  x   /  AST  19  /  ALT  17  /  AlkPhos  65  07-13          Urinalysis Basic - ( 13 Jul 2024 03:57 )    Color: x / Appearance: x / SG: x / pH: x  Gluc: 111 mg/dL / Ketone: x  / Bili: x / Urobili: x   Blood: x / Protein: x / Nitrite: x   Leuk Esterase: x / RBC: x / WBC x   Sq Epi: x / Non Sq Epi: x / Bacteria: x        Blood Gas Venous Comprehensive Result: Performed In Lab (07-13-24 @ 03:57)      RADIOLOGY & ADDITIONAL TESTS: Reviewed

## 2024-07-13 NOTE — PROGRESS NOTE ADULT - PROBLEM SELECTOR PLAN 1
- Pt hypoxic at home low 80s, in ED given nonrebreather now satting 95% on 10L  - COVID positive  - CXR and CT Chest (7/7) demonstrate lower lobe atlectasis with pleural effusion, cannot exclude PNA.   - CXR (7/10): small layering pleural effusion  - Procalcitonin elevated  - D-dimer elevated, cannot exclude resp fail with PE  2/2 covid hypercoaguable state  - Bedside POCUS (7/10): no evidence of fluid overload  - venous duplex LE DVT (7/10): neg  -proBNP elevated: 4741    Plan:  - Pt not candidate for remdesevir given poor renal function  - d/c'ed decadron 6mg IV (7/8-7/11). Pt given decadron 10mg on 7/7   - c/w IV ceftriaxone 1g. elevated procal. (day 4)  - on venturi mask 3 L  - hold V/Q scan, patient cannot tolerate lying flat   - f/u TTE - Pt hypoxic at home low 80s, in ED given nonrebreather now satting 95% on 10L  - COVID positive  - CXR and CT Chest (7/7) demonstrate lower lobe atlectasis with pleural effusion, cannot exclude PNA.   - CXR (7/10): small layering pleural effusion  - Procalcitonin elevated  - D-dimer elevated, cannot exclude resp fail with PE  2/2 covid hypercoaguable state  - Bedside POCUS (7/10): no evidence of fluid overload  - venous duplex LE DVT (7/10): neg  - proBNP elevated: 4741  - TTE no RHS    Plan:  - Pt not candidate for remdesevir given poor renal function  - d/c'ed decadron 6mg IV (7/8-7/11). Pt given decadron 10mg on 7/7   - c/w IV ceftriaxone 1g. elevated procal. (day 4)  - on venturi mask 3 L  - continue to monitor VBG   - hold V/Q scan, patient cannot tolerate lying flat  --likely not needed in the setting of neg echo, and improving clinical status

## 2024-07-13 NOTE — PROGRESS NOTE ADULT - PROBLEM SELECTOR PLAN 2
Pt presented with urinary retention x 24h  BUN/Cr elevated to 51/3.57, worsening to 73/4.5  CT shows Indeterminant 1.8 cm right renal lesion (7/7)  Contreras placed 7/8, dark yellow urine. Mental status improved  U/A positive for small amounts of bilirubin, protein 100, trace LEC. Negative bacteria, ketones, blood, WBC, RBC, b  FENa 2.8%, suggestive of Intrinsic etiology  Hyperkalemic to 6.3 --> 2 rounds of lokelma, bicarb, insulin. K+ now 5.0  Hypocalcemia --> s/p calcium gluconate  Oligura --> patient urine output 400mL in 24h  renal U/S (7/10): right renal cysts. no hydronephrosis. bladder only with contreras.    Plan:  -nephro following  -hold Bumex IV  -hold fluids  -c/w contreras, strict I/Os  -continue to monitor BID BMP for K+, BUN/Cr. Ca and Phos levels

## 2024-07-14 LAB
ALBUMIN SERPL ELPH-MCNC: 3.1 G/DL — LOW (ref 3.3–5)
ALP SERPL-CCNC: 58 U/L — SIGNIFICANT CHANGE UP (ref 40–120)
ALT FLD-CCNC: 17 U/L — SIGNIFICANT CHANGE UP (ref 4–33)
ANION GAP SERPL CALC-SCNC: 15 MMOL/L — HIGH (ref 7–14)
AST SERPL-CCNC: 22 U/L — SIGNIFICANT CHANGE UP (ref 4–32)
BASOPHILS # BLD AUTO: 0.03 K/UL — SIGNIFICANT CHANGE UP (ref 0–0.2)
BASOPHILS NFR BLD AUTO: 0.4 % — SIGNIFICANT CHANGE UP (ref 0–2)
BILIRUB SERPL-MCNC: 0.4 MG/DL — SIGNIFICANT CHANGE UP (ref 0.2–1.2)
BLOOD GAS VENOUS COMPREHENSIVE RESULT: SIGNIFICANT CHANGE UP
BUN SERPL-MCNC: 36 MG/DL — HIGH (ref 7–23)
CALCIUM SERPL-MCNC: 8.7 MG/DL — SIGNIFICANT CHANGE UP (ref 8.4–10.5)
CHLORIDE SERPL-SCNC: 101 MMOL/L — SIGNIFICANT CHANGE UP (ref 98–107)
CO2 SERPL-SCNC: 21 MMOL/L — LOW (ref 22–31)
CREAT SERPL-MCNC: 1.09 MG/DL — SIGNIFICANT CHANGE UP (ref 0.5–1.3)
EGFR: 46 ML/MIN/1.73M2 — LOW
EOSINOPHIL # BLD AUTO: 0.23 K/UL — SIGNIFICANT CHANGE UP (ref 0–0.5)
EOSINOPHIL NFR BLD AUTO: 3.3 % — SIGNIFICANT CHANGE UP (ref 0–6)
GLUCOSE BLDC GLUCOMTR-MCNC: 115 MG/DL — HIGH (ref 70–99)
GLUCOSE BLDC GLUCOMTR-MCNC: 118 MG/DL — HIGH (ref 70–99)
GLUCOSE BLDC GLUCOMTR-MCNC: 136 MG/DL — HIGH (ref 70–99)
GLUCOSE BLDC GLUCOMTR-MCNC: 161 MG/DL — HIGH (ref 70–99)
GLUCOSE BLDC GLUCOMTR-MCNC: 203 MG/DL — HIGH (ref 70–99)
GLUCOSE SERPL-MCNC: 124 MG/DL — HIGH (ref 70–99)
HCT VFR BLD CALC: 37 % — SIGNIFICANT CHANGE UP (ref 34.5–45)
HGB BLD-MCNC: 11.8 G/DL — SIGNIFICANT CHANGE UP (ref 11.5–15.5)
IANC: 4.16 K/UL — SIGNIFICANT CHANGE UP (ref 1.8–7.4)
IMM GRANULOCYTES NFR BLD AUTO: 2.5 % — HIGH (ref 0–0.9)
LYMPHOCYTES # BLD AUTO: 1.1 K/UL — SIGNIFICANT CHANGE UP (ref 1–3.3)
LYMPHOCYTES # BLD AUTO: 15.9 % — SIGNIFICANT CHANGE UP (ref 13–44)
MAGNESIUM SERPL-MCNC: 1.9 MG/DL — SIGNIFICANT CHANGE UP (ref 1.6–2.6)
MCHC RBC-ENTMCNC: 26.3 PG — LOW (ref 27–34)
MCHC RBC-ENTMCNC: 31.9 GM/DL — LOW (ref 32–36)
MCV RBC AUTO: 82.6 FL — SIGNIFICANT CHANGE UP (ref 80–100)
MONOCYTES # BLD AUTO: 1.24 K/UL — HIGH (ref 0–0.9)
MONOCYTES NFR BLD AUTO: 17.9 % — HIGH (ref 2–14)
NEUTROPHILS # BLD AUTO: 4.16 K/UL — SIGNIFICANT CHANGE UP (ref 1.8–7.4)
NEUTROPHILS NFR BLD AUTO: 60 % — SIGNIFICANT CHANGE UP (ref 43–77)
NRBC # BLD: 0 /100 WBCS — SIGNIFICANT CHANGE UP (ref 0–0)
NRBC # FLD: 0 K/UL — SIGNIFICANT CHANGE UP (ref 0–0)
PHOSPHATE SERPL-MCNC: 2.5 MG/DL — SIGNIFICANT CHANGE UP (ref 2.5–4.5)
PLATELET # BLD AUTO: 228 K/UL — SIGNIFICANT CHANGE UP (ref 150–400)
POTASSIUM SERPL-MCNC: 3.4 MMOL/L — LOW (ref 3.5–5.3)
POTASSIUM SERPL-SCNC: 3.4 MMOL/L — LOW (ref 3.5–5.3)
PROT SERPL-MCNC: 5.8 G/DL — LOW (ref 6–8.3)
RBC # BLD: 4.48 M/UL — SIGNIFICANT CHANGE UP (ref 3.8–5.2)
RBC # FLD: 15.4 % — HIGH (ref 10.3–14.5)
SODIUM SERPL-SCNC: 137 MMOL/L — SIGNIFICANT CHANGE UP (ref 135–145)
WBC # BLD: 6.93 K/UL — SIGNIFICANT CHANGE UP (ref 3.8–10.5)
WBC # FLD AUTO: 6.93 K/UL — SIGNIFICANT CHANGE UP (ref 3.8–10.5)

## 2024-07-14 PROCEDURE — 99233 SBSQ HOSP IP/OBS HIGH 50: CPT | Mod: GC

## 2024-07-14 RX ORDER — HYDROCORTISONE VALERATE 0.2 %
1 CREAM (GRAM) TOPICAL ONCE
Refills: 0 | Status: COMPLETED | OUTPATIENT
Start: 2024-07-14 | End: 2024-07-14

## 2024-07-14 RX ORDER — MINERAL OIL, PETROLATUM, PHENYLEPHRINE HYDROCHLORIDE 140; 719; 2.5 MG/G; MG/G; MG/G
1 OINTMENT RECTAL; TOPICAL ONCE
Refills: 0 | Status: DISCONTINUED | OUTPATIENT
Start: 2024-07-14 | End: 2024-07-14

## 2024-07-14 RX ORDER — DULOXETINE HYDROCHLORIDE 20 MG/1
80 CAPSULE, DELAYED RELEASE ORAL DAILY
Refills: 0 | Status: DISCONTINUED | OUTPATIENT
Start: 2024-07-14 | End: 2024-07-17

## 2024-07-14 RX ORDER — BUPROPION HYDROCHLORIDE 150 MG/1
400 TABLET, EXTENDED RELEASE ORAL DAILY
Refills: 0 | Status: DISCONTINUED | OUTPATIENT
Start: 2024-07-14 | End: 2024-07-14

## 2024-07-14 RX ORDER — HYDROCORTISONE VALERATE 0.2 %
1 CREAM (GRAM) TOPICAL ONCE
Refills: 0 | Status: DISCONTINUED | OUTPATIENT
Start: 2024-07-14 | End: 2024-07-14

## 2024-07-14 RX ORDER — POTASSIUM CHLORIDE 600 MG/1
40 TABLET, FILM COATED, EXTENDED RELEASE ORAL ONCE
Refills: 0 | Status: COMPLETED | OUTPATIENT
Start: 2024-07-14 | End: 2024-07-14

## 2024-07-14 RX ORDER — BUPROPION HYDROCHLORIDE 150 MG/1
400 TABLET, EXTENDED RELEASE ORAL DAILY
Refills: 0 | Status: DISCONTINUED | OUTPATIENT
Start: 2024-07-14 | End: 2024-07-17

## 2024-07-14 RX ADMIN — INSULIN LISPRO 4: 100 INJECTION, SOLUTION SUBCUTANEOUS at 11:11

## 2024-07-14 RX ADMIN — POTASSIUM CHLORIDE 40 MILLIEQUIVALENT(S): 600 TABLET, FILM COATED, EXTENDED RELEASE ORAL at 08:29

## 2024-07-14 RX ADMIN — Medication 80 MILLIGRAM(S): at 11:12

## 2024-07-14 RX ADMIN — DULOXETINE HYDROCHLORIDE 40 MILLIGRAM(S): 20 CAPSULE, DELAYED RELEASE ORAL at 11:13

## 2024-07-14 RX ADMIN — Medication 650 MILLIGRAM(S): at 13:30

## 2024-07-14 RX ADMIN — Medication 650 MILLIGRAM(S): at 20:03

## 2024-07-14 RX ADMIN — Medication 1 APPLICATION(S): at 17:19

## 2024-07-14 RX ADMIN — CARVEDILOL PHOSPHATE 25 MILLIGRAM(S): 80 CAPSULE, EXTENDED RELEASE ORAL at 05:16

## 2024-07-14 RX ADMIN — MUPIROCIN 1 APPLICATION(S): 20 OINTMENT TOPICAL at 17:17

## 2024-07-14 RX ADMIN — Medication 3 MILLIGRAM(S): at 21:50

## 2024-07-14 RX ADMIN — Medication 100 MILLIGRAM(S): at 08:28

## 2024-07-14 RX ADMIN — BRIMONIDINE TARTRATE 1 DROP(S): 1.5 SOLUTION/ DROPS OPHTHALMIC at 05:16

## 2024-07-14 RX ADMIN — MUPIROCIN 1 APPLICATION(S): 20 OINTMENT TOPICAL at 05:16

## 2024-07-14 RX ADMIN — HYDRALAZINE HYDROCHLORIDE 75 MILLIGRAM(S): 50 TABLET ORAL at 21:48

## 2024-07-14 RX ADMIN — BUPROPION HYDROCHLORIDE 300 MILLIGRAM(S): 150 TABLET, EXTENDED RELEASE ORAL at 11:13

## 2024-07-14 RX ADMIN — CARVEDILOL PHOSPHATE 25 MILLIGRAM(S): 80 CAPSULE, EXTENDED RELEASE ORAL at 17:18

## 2024-07-14 RX ADMIN — POLYETHYLENE GLYCOL 3350 17 GRAM(S): 1 POWDER ORAL at 11:12

## 2024-07-14 RX ADMIN — Medication 650 MILLIGRAM(S): at 21:03

## 2024-07-14 RX ADMIN — Medication 2 TABLET(S): at 21:54

## 2024-07-14 RX ADMIN — PANTOPRAZOLE SODIUM 40 MILLIGRAM(S): 40 INJECTION, POWDER, FOR SOLUTION INTRAVENOUS at 05:15

## 2024-07-14 RX ADMIN — Medication 80 MILLIGRAM(S): at 05:16

## 2024-07-14 RX ADMIN — Medication 650 MILLIGRAM(S): at 12:56

## 2024-07-14 RX ADMIN — BRIMONIDINE TARTRATE 1 DROP(S): 1.5 SOLUTION/ DROPS OPHTHALMIC at 17:19

## 2024-07-14 RX ADMIN — Medication 80 MILLIGRAM(S): at 00:48

## 2024-07-14 RX ADMIN — Medication 1 APPLICATION(S): at 11:13

## 2024-07-14 NOTE — PROGRESS NOTE ADULT - PROBLEM SELECTOR PLAN 4
BP stable 110-130/55-75 on admission  Now in high 200s/100s  Hold precautions for 170s/80s    Plan:  - c/w coreg 25mg BID  - c/w 75mg Hydralazine TID  - c/w Nifedipine 90mg XL qd  - IV tylenol for pain  - family declined oxycodone, bad experience in the past  - hold home meds below bumex 1mg, losartan 100mg

## 2024-07-14 NOTE — PROGRESS NOTE ADULT - SUBJECTIVE AND OBJECTIVE BOX
Patient is a 89y old  Female who presents with a chief complaint of COVID w/ hypoxia    SUBJECTIVE / OVERNIGHT EVENTS: Patient seen and examined at bedside. No overnight events.    MEDICATIONS  (STANDING):  brimonidine 0.2% Ophthalmic Solution 1 Drop(s) Both EYES two times a day  buPROPion XL (24-Hour) . 300 milliGRAM(s) Oral daily  carvedilol 25 milliGRAM(s) Oral every 12 hours  cefTRIAXone   IVPB 1000 milliGRAM(s) IV Intermittent every 24 hours  chlorhexidine 2% Cloths 1 Application(s) Topical daily  dextrose 10% Bolus 125 milliLiter(s) IV Bolus once  dextrose 5%. 1000 milliLiter(s) (50 mL/Hr) IV Continuous <Continuous>  dextrose 5%. 1000 milliLiter(s) (100 mL/Hr) IV Continuous <Continuous>  dextrose 50% Injectable 25 Gram(s) IV Push once  dextrose 50% Injectable 12.5 Gram(s) IV Push once  DULoxetine 40 milliGRAM(s) Oral daily  glucagon  Injectable 1 milliGRAM(s) IntraMuscular once  hydrALAZINE 75 milliGRAM(s) Oral three times a day  insulin lispro (ADMELOG) corrective regimen sliding scale   SubCutaneous every 6 hours  melatonin 3 milliGRAM(s) Oral at bedtime  mupirocin 2% Ointment 1 Application(s) Both Nostrils two times a day  NIFEdipine XL 90 milliGRAM(s) Oral daily  pantoprazole    Tablet 40 milliGRAM(s) Oral before breakfast  polyethylene glycol 3350 17 Gram(s) Oral daily  potassium chloride    Tablet ER 40 milliEquivalent(s) Oral once  senna 2 Tablet(s) Oral at bedtime  simethicone 80 milliGRAM(s) Chew every 6 hours    MEDICATIONS  (PRN):  acetaminophen     Tablet .. 650 milliGRAM(s) Oral every 6 hours PRN Temp greater or equal to 38.5C (101.3F), Mild Pain (1 - 3), Moderate Pain (4 - 6)  dextrose Oral Gel 15 Gram(s) Oral once PRN Blood Glucose LESS THAN 70 milliGRAM(s)/deciliter    Vital Signs Last 24 Hrs  T(C): 36.3 (14 Jul 2024 05:15), Max: 36.9 (13 Jul 2024 17:15)  T(F): 97.4 (14 Jul 2024 05:15), Max: 98.4 (13 Jul 2024 17:15)  HR: 91 (14 Jul 2024 05:15) (80 - 91)  BP: 146/60 (14 Jul 2024 05:15) (146/60 - 171/89)  BP(mean): --  RR: 17 (14 Jul 2024 05:15) (17 - 18)  SpO2: 100% (14 Jul 2024 05:15) (97% - 100%)    Parameters below as of 14 Jul 2024 05:15  Patient On (Oxygen Delivery Method): mask, Venturi    O2 Concentration (%): 24    CAPILLARY BLOOD GLUCOSE  POCT Blood Glucose.: 118 mg/dL (14 Jul 2024 05:24)  POCT Blood Glucose.: 136 mg/dL (14 Jul 2024 00:50)  POCT Blood Glucose.: 128 mg/dL (13 Jul 2024 17:01)  POCT Blood Glucose.: 224 mg/dL (13 Jul 2024 12:06)    I&O's Summary    13 Jul 2024 07:01  -  14 Jul 2024 07:00  --------------------------------------------------------  IN: 540 mL / OUT: 1000 mL / NET: -460 mL    PHYSICAL EXAM:  General: NAD  CV: RRR, normal S1 and S2, no m/r/g  Lungs: increased respiratory effort. CTAB, no wheezes, rales, or rhonchi  Abd: soft, nontender, nondistended  Ext: no edema   Pysch: AAOx2    LABS:                        11.8   6.93  )-----------( 228      ( 14 Jul 2024 05:40 )             37.0     07-14    137  |  101  |  36<H>  ----------------------------<  124<H>  3.4<L>   |  21<L>  |  1.09    Ca    8.7      14 Jul 2024 05:40  Phos  2.5     07-14  Mg     1.90     07-14    TPro  5.8<L>  /  Alb  3.1<L>  /  TBili  0.4  /  DBili  x   /  AST  22  /  ALT  17  /  AlkPhos  58  07-14    Urinalysis Basic - ( 14 Jul 2024 05:40 )    Color: x / Appearance: x / SG: x / pH: x  Gluc: 124 mg/dL / Ketone: x  / Bili: x / Urobili: x   Blood: x / Protein: x / Nitrite: x   Leuk Esterase: x / RBC: x / WBC x   Sq Epi: x / Non Sq Epi: x / Bacteria: x    RADIOLOGY & ADDITIONAL TESTS:    Ruddy Eason MD, Internal Medicine Resident Patient is a 89y old  Female who presents with a chief complaint of COVID w/ hypoxia    SUBJECTIVE / OVERNIGHT EVENTS: Patient seen and examined at bedside. No overnight events. Without acute concerns this AM, although notes that she feels more lethargic than usual this AM. States she did not sleep well overnight. Denies CP, SOB, subjective fever, chills, n/v/d.    MEDICATIONS  (STANDING):  brimonidine 0.2% Ophthalmic Solution 1 Drop(s) Both EYES two times a day  buPROPion XL (24-Hour) . 300 milliGRAM(s) Oral daily  carvedilol 25 milliGRAM(s) Oral every 12 hours  cefTRIAXone   IVPB 1000 milliGRAM(s) IV Intermittent every 24 hours  chlorhexidine 2% Cloths 1 Application(s) Topical daily  dextrose 10% Bolus 125 milliLiter(s) IV Bolus once  dextrose 5%. 1000 milliLiter(s) (50 mL/Hr) IV Continuous <Continuous>  dextrose 5%. 1000 milliLiter(s) (100 mL/Hr) IV Continuous <Continuous>  dextrose 50% Injectable 25 Gram(s) IV Push once  dextrose 50% Injectable 12.5 Gram(s) IV Push once  DULoxetine 40 milliGRAM(s) Oral daily  glucagon  Injectable 1 milliGRAM(s) IntraMuscular once  hydrALAZINE 75 milliGRAM(s) Oral three times a day  insulin lispro (ADMELOG) corrective regimen sliding scale   SubCutaneous every 6 hours  melatonin 3 milliGRAM(s) Oral at bedtime  mupirocin 2% Ointment 1 Application(s) Both Nostrils two times a day  NIFEdipine XL 90 milliGRAM(s) Oral daily  pantoprazole    Tablet 40 milliGRAM(s) Oral before breakfast  polyethylene glycol 3350 17 Gram(s) Oral daily  potassium chloride    Tablet ER 40 milliEquivalent(s) Oral once  senna 2 Tablet(s) Oral at bedtime  simethicone 80 milliGRAM(s) Chew every 6 hours    MEDICATIONS  (PRN):  acetaminophen     Tablet .. 650 milliGRAM(s) Oral every 6 hours PRN Temp greater or equal to 38.5C (101.3F), Mild Pain (1 - 3), Moderate Pain (4 - 6)  dextrose Oral Gel 15 Gram(s) Oral once PRN Blood Glucose LESS THAN 70 milliGRAM(s)/deciliter    Vital Signs Last 24 Hrs  T(C): 36.3 (14 Jul 2024 05:15), Max: 36.9 (13 Jul 2024 17:15)  T(F): 97.4 (14 Jul 2024 05:15), Max: 98.4 (13 Jul 2024 17:15)  HR: 91 (14 Jul 2024 05:15) (80 - 91)  BP: 146/60 (14 Jul 2024 05:15) (146/60 - 171/89)  BP(mean): --  RR: 17 (14 Jul 2024 05:15) (17 - 18)  SpO2: 100% (14 Jul 2024 05:15) (97% - 100%)    Parameters below as of 14 Jul 2024 05:15  Patient On (Oxygen Delivery Method): mask, Venturi    O2 Concentration (%): 24    CAPILLARY BLOOD GLUCOSE  POCT Blood Glucose.: 118 mg/dL (14 Jul 2024 05:24)  POCT Blood Glucose.: 136 mg/dL (14 Jul 2024 00:50)  POCT Blood Glucose.: 128 mg/dL (13 Jul 2024 17:01)  POCT Blood Glucose.: 224 mg/dL (13 Jul 2024 12:06)    I&O's Summary    13 Jul 2024 07:01  -  14 Jul 2024 07:00  --------------------------------------------------------  IN: 540 mL / OUT: 1000 mL / NET: -460 mL    PHYSICAL EXAM:  General: NAD  CV: RRR, normal S1 and S2, no m/r/g  Lungs: increased respiratory effort. CTAB, no wheezes, rales, or rhonchi  Abd: soft, nontender, nondistended  Ext: no edema   Pysch: AAOx2    LABS:                        11.8   6.93  )-----------( 228      ( 14 Jul 2024 05:40 )             37.0     07-14    137  |  101  |  36<H>  ----------------------------<  124<H>  3.4<L>   |  21<L>  |  1.09    Ca    8.7      14 Jul 2024 05:40  Phos  2.5     07-14  Mg     1.90     07-14    TPro  5.8<L>  /  Alb  3.1<L>  /  TBili  0.4  /  DBili  x   /  AST  22  /  ALT  17  /  AlkPhos  58  07-14    Urinalysis Basic - ( 14 Jul 2024 05:40 )    Color: x / Appearance: x / SG: x / pH: x  Gluc: 124 mg/dL / Ketone: x  / Bili: x / Urobili: x   Blood: x / Protein: x / Nitrite: x   Leuk Esterase: x / RBC: x / WBC x   Sq Epi: x / Non Sq Epi: x / Bacteria: x    RADIOLOGY & ADDITIONAL TESTS:    Ruddy Eason MD, Internal Medicine Resident

## 2024-07-14 NOTE — PROGRESS NOTE ADULT - PROBLEM SELECTOR PLAN 9
DVT: SCD  Diet: pureed  Code Status: DNR/DNI w NIV  Dispo: pending DVT: SCD  Diet: Soft and bite sized  Code Status: DNR/DNI w NIV  Dispo: pending

## 2024-07-14 NOTE — PROGRESS NOTE ADULT - PROBLEM SELECTOR PLAN 1
- Pt hypoxic at home low 80s, in ED given nonrebreather now satting 95% on 10L  - COVID positive  - CXR and CT Chest (7/7) demonstrate lower lobe atlectasis with pleural effusion, cannot exclude PNA.   - CXR (7/10): small layering pleural effusion  - Procalcitonin elevated  - D-dimer elevated, cannot exclude resp fail with PE  2/2 covid hypercoaguable state  - Bedside POCUS (7/10): no evidence of fluid overload  - venous duplex LE DVT (7/10): neg  - proBNP elevated: 4741  - TTE no RHS    Plan:  - Pt not candidate for remdesevir given poor renal function  - d/c'ed decadron 6mg IV (7/8-7/11). Pt given decadron 10mg on 7/7   - c/w IV ceftriaxone 1g. elevated procal. (day 4)  - on venturi mask 3 L  - continue to monitor VBG   - hold V/Q scan, patient cannot tolerate lying flat  --likely not needed in the setting of neg echo, and improving clinical status - Pt hypoxic at home low 80s, in ED given nonrebreather now satting 95% on 10L  - COVID positive  - CXR and CT Chest (7/7) demonstrate lower lobe atlectasis with pleural effusion, cannot exclude PNA.   - CXR (7/10): small layering pleural effusion  - Procalcitonin elevated  - D-dimer elevated, cannot exclude resp fail with PE  2/2 covid hypercoaguable state  - Bedside POCUS (7/10): no evidence of fluid overload  - venous duplex LE DVT (7/10): neg  - proBNP elevated: 4741  - TTE no RHS    Plan:  - Pt not candidate for remdesevir given poor renal function  - d/c'ed decadron 6mg IV (7/8-7/11). Pt given decadron 10mg on 7/7   - c/w IV ceftriaxone 1g. elevated procal. (day 4)  - on venturi mask 3 L - will try to wean down to NC  - continue to monitor VBG   - hold V/Q scan, patient cannot tolerate lying flat  --likely not needed in the setting of neg echo, and improving clinical status

## 2024-07-15 LAB
ALBUMIN SERPL ELPH-MCNC: 3.3 G/DL — SIGNIFICANT CHANGE UP (ref 3.3–5)
ALP SERPL-CCNC: 62 U/L — SIGNIFICANT CHANGE UP (ref 40–120)
ALT FLD-CCNC: 18 U/L — SIGNIFICANT CHANGE UP (ref 4–33)
ANION GAP SERPL CALC-SCNC: 13 MMOL/L — SIGNIFICANT CHANGE UP (ref 7–14)
AST SERPL-CCNC: 21 U/L — SIGNIFICANT CHANGE UP (ref 4–32)
BASE EXCESS BLDCOV CALC-SCNC: 1.3 MMOL/L — HIGH (ref -9.3–0.3)
BILIRUB SERPL-MCNC: 0.4 MG/DL — SIGNIFICANT CHANGE UP (ref 0.2–1.2)
BUN SERPL-MCNC: 28 MG/DL — HIGH (ref 7–23)
CALCIUM SERPL-MCNC: 8.7 MG/DL — SIGNIFICANT CHANGE UP (ref 8.4–10.5)
CHLORIDE SERPL-SCNC: 100 MMOL/L — SIGNIFICANT CHANGE UP (ref 98–107)
CO2 BLDCOV-SCNC: 26 MMOL/L — SIGNIFICANT CHANGE UP
CO2 SERPL-SCNC: 22 MMOL/L — SIGNIFICANT CHANGE UP (ref 22–31)
CREAT SERPL-MCNC: 1 MG/DL — SIGNIFICANT CHANGE UP (ref 0.5–1.3)
EGFR: 52 ML/MIN/1.73M2 — LOW
GAS PNL BLDCOV: 7.45 — SIGNIFICANT CHANGE UP (ref 7.25–7.45)
GLUCOSE BLDC GLUCOMTR-MCNC: 115 MG/DL — HIGH (ref 70–99)
GLUCOSE BLDC GLUCOMTR-MCNC: 125 MG/DL — HIGH (ref 70–99)
GLUCOSE BLDC GLUCOMTR-MCNC: 134 MG/DL — HIGH (ref 70–99)
GLUCOSE BLDC GLUCOMTR-MCNC: 161 MG/DL — HIGH (ref 70–99)
GLUCOSE BLDC GLUCOMTR-MCNC: 166 MG/DL — HIGH (ref 70–99)
GLUCOSE SERPL-MCNC: 120 MG/DL — HIGH (ref 70–99)
HCO3 BLDCOV-SCNC: 25 MMOL/L — SIGNIFICANT CHANGE UP
HCT VFR BLD CALC: 38.6 % — SIGNIFICANT CHANGE UP (ref 34.5–45)
HGB BLD-MCNC: 12.4 G/DL — SIGNIFICANT CHANGE UP (ref 11.5–15.5)
MAGNESIUM SERPL-MCNC: 1.9 MG/DL — SIGNIFICANT CHANGE UP (ref 1.6–2.6)
MCHC RBC-ENTMCNC: 26.3 PG — LOW (ref 27–34)
MCHC RBC-ENTMCNC: 32.1 GM/DL — SIGNIFICANT CHANGE UP (ref 32–36)
MCV RBC AUTO: 81.8 FL — SIGNIFICANT CHANGE UP (ref 80–100)
NRBC # BLD: 0 /100 WBCS — SIGNIFICANT CHANGE UP (ref 0–0)
NRBC # FLD: 0 K/UL — SIGNIFICANT CHANGE UP (ref 0–0)
PCO2 BLDCOV: 36 MMHG — SIGNIFICANT CHANGE UP (ref 27–49)
PHOSPHATE SERPL-MCNC: 2.1 MG/DL — LOW (ref 2.5–4.5)
PLATELET # BLD AUTO: 233 K/UL — SIGNIFICANT CHANGE UP (ref 150–400)
PO2 BLDCOA: 46 MMHG — HIGH (ref 17–41)
POTASSIUM SERPL-MCNC: 3.6 MMOL/L — SIGNIFICANT CHANGE UP (ref 3.5–5.3)
POTASSIUM SERPL-SCNC: 3.6 MMOL/L — SIGNIFICANT CHANGE UP (ref 3.5–5.3)
PROT SERPL-MCNC: 6.4 G/DL — SIGNIFICANT CHANGE UP (ref 6–8.3)
RBC # BLD: 4.72 M/UL — SIGNIFICANT CHANGE UP (ref 3.8–5.2)
RBC # FLD: 15.6 % — HIGH (ref 10.3–14.5)
SAO2 % BLDCOV: 77 % — SIGNIFICANT CHANGE UP
SODIUM SERPL-SCNC: 135 MMOL/L — SIGNIFICANT CHANGE UP (ref 135–145)
WBC # BLD: 7.45 K/UL — SIGNIFICANT CHANGE UP (ref 3.8–10.5)
WBC # FLD AUTO: 7.45 K/UL — SIGNIFICANT CHANGE UP (ref 3.8–10.5)

## 2024-07-15 PROCEDURE — 99232 SBSQ HOSP IP/OBS MODERATE 35: CPT | Mod: GC

## 2024-07-15 RX ORDER — SOD PHOS DI, MONO/K PHOS MONO 250 MG
1 TABLET ORAL ONCE
Refills: 0 | Status: COMPLETED | OUTPATIENT
Start: 2024-07-15 | End: 2024-07-15

## 2024-07-15 RX ADMIN — Medication 100 MILLIGRAM(S): at 08:13

## 2024-07-15 RX ADMIN — Medication 90 MILLIGRAM(S): at 06:09

## 2024-07-15 RX ADMIN — BUPROPION HYDROCHLORIDE 400 MILLIGRAM(S): 150 TABLET, EXTENDED RELEASE ORAL at 11:37

## 2024-07-15 RX ADMIN — INSULIN LISPRO 2: 100 INJECTION, SOLUTION SUBCUTANEOUS at 06:55

## 2024-07-15 RX ADMIN — MUPIROCIN 1 APPLICATION(S): 20 OINTMENT TOPICAL at 17:32

## 2024-07-15 RX ADMIN — Medication 3 MILLIGRAM(S): at 22:21

## 2024-07-15 RX ADMIN — DULOXETINE HYDROCHLORIDE 80 MILLIGRAM(S): 20 CAPSULE, DELAYED RELEASE ORAL at 11:37

## 2024-07-15 RX ADMIN — Medication 650 MILLIGRAM(S): at 15:48

## 2024-07-15 RX ADMIN — HYDRALAZINE HYDROCHLORIDE 75 MILLIGRAM(S): 50 TABLET ORAL at 22:20

## 2024-07-15 RX ADMIN — CARVEDILOL PHOSPHATE 25 MILLIGRAM(S): 80 CAPSULE, EXTENDED RELEASE ORAL at 17:32

## 2024-07-15 RX ADMIN — Medication 1 PACKET(S): at 08:13

## 2024-07-15 RX ADMIN — HYDRALAZINE HYDROCHLORIDE 75 MILLIGRAM(S): 50 TABLET ORAL at 06:08

## 2024-07-15 RX ADMIN — BRIMONIDINE TARTRATE 1 DROP(S): 1.5 SOLUTION/ DROPS OPHTHALMIC at 06:16

## 2024-07-15 RX ADMIN — BRIMONIDINE TARTRATE 1 DROP(S): 1.5 SOLUTION/ DROPS OPHTHALMIC at 17:33

## 2024-07-15 RX ADMIN — PANTOPRAZOLE SODIUM 40 MILLIGRAM(S): 40 INJECTION, POWDER, FOR SOLUTION INTRAVENOUS at 06:08

## 2024-07-15 RX ADMIN — CARVEDILOL PHOSPHATE 25 MILLIGRAM(S): 80 CAPSULE, EXTENDED RELEASE ORAL at 06:30

## 2024-07-15 RX ADMIN — Medication 2 TABLET(S): at 22:20

## 2024-07-15 RX ADMIN — MUPIROCIN 1 APPLICATION(S): 20 OINTMENT TOPICAL at 06:15

## 2024-07-15 RX ADMIN — Medication 1 APPLICATION(S): at 11:56

## 2024-07-15 RX ADMIN — INSULIN LISPRO 2: 100 INJECTION, SOLUTION SUBCUTANEOUS at 11:36

## 2024-07-15 NOTE — PHYSICAL THERAPY INITIAL EVALUATION ADULT - LEVEL OF INDEPENDENCE: SIT/STAND, REHAB EVAL
to be assessed, deferred secondary to pt. feeling dizzy while seated at edge of bed. Pt. returned to bed, /66. RN made aware.

## 2024-07-15 NOTE — PHYSICAL THERAPY INITIAL EVALUATION ADULT - GENERAL OBSERVATIONS, REHAB EVAL
Consult received, chart reviewed. Patient received in bed, no apparent distress, +tele, +pulse ox, +contreras, family present. Pt. agreeable to participate in PT.

## 2024-07-15 NOTE — PROGRESS NOTE ADULT - ATTENDING COMMENTS
Seen and examined by me this afternoon, daughter at bedside, not feeling great, decreased appetite, some rectal pain, +BM  Respiratory alkalosis is overall better although still present, probably ok to d/c venti mask at this time, f/up VBG in AM, saturating well on RA  HTN/BP has improved on current meds, to be continued  ANAMARIA continues to improve, Cr is 1.09 today, Saavedra remains in place, can do TOV this week  S/p 4 days of dexa for COVID-19 infection now on CTX for possible superimposed bacterial PNA, to be continued thru tomorrow 7/15  Wellbutrin/cymbalta doses to be adjusted (daughter states that she is on cymbalta 80mg QD and wellbutrin 400mg QD)  DNR/DNI  PT evaluation now that BP has improved  Discussed with daughter at bedside  Rest as above
Seen and examined by me this afternoon, having lunch at time of visit, although with decreased appetite, no BM for several days  Still with respiratory alkalosis but better than yesterday, repeat VBG in AM, saturating well on RA, was on venti-mask earlier, wean off O2 as tolerated  Adding bowel regimen for constipation  HTN/BP is still elevated but better than yesterday, monitor for now, if needed will increase hydralazine to max dose of 100mg TID, c/w other BP meds  ANAMARIA, Cr is slightly better today at 1.44, Saavedra remains in place for now, f/up BMP in AM  S/p 4 days of dexa for COVID-19 infection now on CTX for possible superimposed bacterial PNA, to be continued thru tomorrow  DNR/DNI  PT evaluation  Rest as above
89 y.o. F with PMH of NIKA on CPAP, glaucoma, T2DM, HTN who presents with lethargy and hypoxia, found to be COVID positive. Also with renal failure.     Family at bedside. Pt is alert and taking PO.     #Respiratory alkalosis:   -improved  -repeat VBG  -can stop venti mask   -if remains persistently elevated, may need diamox    #Hypertensive crisis:   -Resolved  -pt with SBP elevated as high as 220  -restarted home coreg 25 mg BID  -restarted nifedipine 90 mg daily  -c/w hydralazine 70 mg TID    #Acute renal failure:   -Resolved  -per family, no known hx of CKD. Per o/p records, had Cr of 0.81 in January of 2024. Currently with cr of 1  -Fena consistent with intrinsic pathology, casts in u/a also. Initially suspected ATN, and Bumex was given. Then tried LR 100cc x 10 hours, and renal function improved. Hold further IVF given hypertension; pt is not taking PO -- continue to encourage.   -pt currently with adequate UOP  -needs TOV  -contreras replaced as pt was found to be retaining  -trend I’s and o’s    #Acute hypoxemic respiratory failure:   -now on RA  -CT with consolidation, consistent with atelectasis but unable to r/o PNA  -on BIPAP briefly,  then NRB, now on RA  -PE also on differential, elevated D-dimer. As discussed with pt's son, holding off on VQ scan for now given pt is altered and is unable to lie flat. Also, less likely given resolution of hypoxia    #COVID 19:   -possible PNA, as read on CT  -s/p decadron x 4 days; will now stop given delirium now that hypoxia has resolved   -not a candidate for Remdesivir due to renal failure  -given clinical picture and elevated procal, will treat for possible superimposed bacterial PNA with CTX; s/p 7 day treatment   -was initiated on SQ heparin. Family declines heparin due to concern for GIB in the past. Family would be willing to consider a/c if PE is found          #GOC: DNR/DNI    #Dispo:   -Pending PT eval and TOV
89 y.o. F with PMH of NIKA on CPAP, glaucoma, T2DM, HTN who presents with lethargy and hypoxia, found to be COVID positive. Also with renal failure.     Pt was agitated ON and pulled out her contreras. Found to be retaining this morning and contreras was replaced. Now on RA. Pt has been persistently hypertensive.     #Hypertensive crisis:   -pt with SBP elevated to 200  -s/p 10 mg labetalol total  -restart home coreg 25 mg BID    #Acute renal failure:   -per family, no known hx of CKD. Per o/p records, had Cr of 0.81 in January of 2024  -Fena consistent with intrinsic pathology, casts in u/a also. Initially suspected ATN, and Bumex was given. Then tried LR 100cc x 10 hours, and renal function improved  -pt currently with adequate UOP  -s/p IV Bumex 1 mg on 7/9  -contreras replaced as pt was found to be retaining  -trend I’s and o’s    #Acute hypoxemic respiratory failure:   -now on RA  -CT with consolidation, consistent with atelectasis but unable to r/o PNA  -on BIPAP briefly,  then NRB, now on RA  -PE also on differential, elevated D-dimer. As discussed with pt's son, holding off on VQ scan for now given pt is altered and is unable to lie flat. Also, less likely given resolution of hypoxia    #COVID 19:   -possible PNA, as read on CT  -s/p decadron x 4 days; will now stop given delirium now that hypoxia has resolved   -not a candidate for Remdesivir due to renal failure  -given clinical picture and elevated procal, will treat for possible superimposed bacterial PNA with CTX. Can plan for 5-7 days total  -was initiated on SQ heparin. Family declines heparin due to concern for GIB in the past. Family would be willing to consider a/c if PE is found      #Hyperkalemia:   -in setting of ANAMARIA, s/p temporizing measures and lokelma  -monitor K  -BMP BID    #T2DM with hyperglycemia:   -likely exacerbated due to decadron administration  -moderate ISS ac and hs    #GOC: DNR/DNI    #Dispo:   -will need PT eval
89 y.o. F with PMH of NIKA on CPAP, glaucoma, T2DM, HTN who presents with lethargy and hypoxia, found to be COVID positive. Also with renal failure.     Family at bedside. Pt is more alert and taking PO. Complaining of abdominal pain due to "gas." VBG consistent with respiratory alkalosis.     #Respiratory alkalosis:   -repeat VBG  -if remains alkalotic, plan for venturi mask in order to improve hypocapnia   -if remains persistently elevated, may need diamox    #Hypertensive crisis:   -pt with SBP elevated as high as 220  -restarted home coreg 25 mg BID  -restarted nifedipine 90 mg daily  -on hydralazine 50 mg TID; will need to uptitrate     #Acute renal failure:   -per family, no known hx of CKD. Per o/p records, had Cr of 0.81 in January of 2024  -Fena consistent with intrinsic pathology, casts in u/a also. Initially suspected ATN, and Bumex was given. Then tried LR 100cc x 10 hours, and renal function improved. Hold further IVF given hypertension; pt is not taking PO -- continue to encourage.   -pt currently with adequate UOP  -s/p IV Bumex 1 mg on 7/9  -contreras replaced as pt was found to be retaining  -trend I’s and o’s    #Acute hypoxemic respiratory failure:   -now on RA  -CT with consolidation, consistent with atelectasis but unable to r/o PNA  -on BIPAP briefly,  then NRB, now on RA  -PE also on differential, elevated D-dimer. As discussed with pt's son, holding off on VQ scan for now given pt is altered and is unable to lie flat. Also, less likely given resolution of hypoxia    #COVID 19:   -possible PNA, as read on CT  -s/p decadron x 4 days; will now stop given delirium now that hypoxia has resolved   -not a candidate for Remdesivir due to renal failure  -given clinical picture and elevated procal, will treat for possible superimposed bacterial PNA with CTX. Can plan for 5-7 days total  -was initiated on SQ heparin. Family declines heparin due to concern for GIB in the past. Family would be willing to consider a/c if PE is found      #Hyperkalemia:   -in setting of ANAMARIA, s/p temporizing measures and lokelma  -monitor K  -BMP BID    #T2DM with hyperglycemia:   -likely exacerbated due to decadron administration  -moderate ISS ac and hs    #GOC: DNR/DNI    #Dispo:   -will need PT eval
89 y.o. F with PMH of NIKA on CPAP, glaucoma, T2DM, HTN who presents with lethargy and hypoxia, found to be COVID positive. Also with renal failure.     #Acute renal failure:   -per family, no known hx of CKD. Per o/p records, had Cr of 0.81 in January of 2024  -Fena consistent with intrinsic pathology, casts in u/a also. Likely ATN in setting of infection  -pt with oliguria, UOP has been recorded as 400 cc in the past 24 hours  -s/p IV Bumex 1 mg x 2 today. Monitor UOP and volume status and dose as appropriate  -trend I’s and o’s    #Acute hypoxemic respiratory failure:   -CT with consolidation, consistent with atelectasis but unable to r/o PNA  -desated to 91% on 6 L NC, escalated to NRB @ 10L   -on BIPAP briefly, if respiratory status worsens, will need to restart BIPAP  -PE also on differential, elevated D-dimer, f/u VQ scan    #COVID 19:   -possible PNA, as read on CT  -c/w decadron due to hypoxia  -not a candidate for Remdesivir due to renal failure  -given clinical picture and elevated procal, will treat for possible superimposed bacterial PNA with CTX. Can plan for 5-7 days total  -was initiated on SQ heparin. Family declines heparin due to concern for GIB in the past. Family would be willing to consider a/c if PE is found      #Hyperkalemia:   -in setting of ANAMARIA, s/p temporizing measures and lokelma  -monitor K  -BMP BID    #T2DM with hyperglycemia:   -likely exacerbated due to decadron administration  -moderate ISS ac and hs    #GOC: DNR/DNI    #Dispo:   -pending further w/u and clinical improvement
89 y.o. F with PMH of NIKA on CPAP, glaucoma, T2DM, HTN who presents with lethargy and hypoxia, found to be COVID positive. Also with renal failure.     #Acute renal failure:   -per family, no known hx of CKD. Per o/p records, had Cr of 0.81 in January of 2024  -Fena consistent with intrinsic pathology, casts in u/a also. Likely ATN in setting of infection  -pt with oliguria, UOP has been recorded as 700cc in the past 24 hour period  -s/p IV Bumex 1 mg on 7/9  -re-evaluated pt today, does not appear volume overloaded. Spoke to nephrology, suggested LR 100cc/hr x 10 hours. 600 cc in urine bag noted since this morning. Continue to monitor UOP  -trend I’s and o’s    #Acute hypoxemic respiratory failure:   -CT with consolidation, consistent with atelectasis but unable to r/o PNA  -desated to 91% on 6 L NC, escalated to NRB @ 10L   -on BIPAP briefly, if respiratory status worsens, will need to restart BIPAP  -PE also on differential, elevated D-dimer. As discussed with pt's son, holding off on VQ scan for now given pt is altered and is unable to lie flat    #COVID 19:   -possible PNA, as read on CT  -c/w decadron due to hypoxia  -not a candidate for Remdesivir due to renal failure  -given clinical picture and elevated procal, will treat for possible superimposed bacterial PNA with CTX. Can plan for 5-7 days total  -was initiated on SQ heparin. Family declines heparin due to concern for GIB in the past. Family would be willing to consider a/c if PE is found      #Hyperkalemia:   -in setting of ANAMARIA, s/p temporizing measures and lokelma  -monitor K  -BMP BID    #T2DM with hyperglycemia:   -likely exacerbated due to decadron administration  -moderate ISS ac and hs    #GOC: DNR/DNI    #Dispo:   -pending further w/u and clinical improvement

## 2024-07-15 NOTE — PROGRESS NOTE ADULT - PROBLEM SELECTOR PLAN 9
DVT: SCD  Diet: Soft and bite sized  Code Status: DNR/DNI w NIV  Dispo: pending DVT: SCD  Diet: Soft and bite sized  Code Status: DNR/DNI w NIV  Dispo: pending PT

## 2024-07-15 NOTE — DIETITIAN INITIAL EVALUATION ADULT - NS FNS DIET ORDER
Diet, Soft and Bite Sized:   Consistent Carbohydrate {No Snacks} (CSTCHO)  DASH/TLC {Sodium & Cholesterol Restricted} (DASH)  Mildly Thick Liquids (MILDTHICKLIQS) (07-14-24 @ 12:12)

## 2024-07-15 NOTE — PROGRESS NOTE ADULT - TIME BILLING
Review of laboratory data, radiology results, consultants' recommendations, documentation in [place], discussion with patient/house staff and interdisciplinary staff (such as , social workers, etc). Interventions were performed as documented above.
Review of laboratory data, radiology results, consultants' recommendations, documentation in [place], discussion with patient/house staff and interdisciplinary staff (such as , social workers, etc). Interventions were performed as documented above.
Bedside exam and interview   Reviewed vitals, labs, consultant notes  Discussed patient's plan of care with housestaff, patient  Documentation of encounter
Review of laboratory data, radiology results, consultants' recommendations, documentation in [place], discussion with patient/house staff and interdisciplinary staff (such as , social workers, etc). Interventions were performed as documented above.
- Ordering, reviewing, and interpreting labs, testing, and imaging  - Independently obtaining a review of systems and performing a physical exam  - Reviewing consultant documentation/recommendations  - Counselling and educating patient and family regarding interpretation of aforementioned items and plan of care  - Documentation of encounter
Review of laboratory data, radiology results, consultants' recommendations, documentation in [place], discussion with patient/house staff and interdisciplinary staff (such as , social workers, etc). Interventions were performed as documented above.
Review of laboratory data, radiology results, consultants' recommendations, documentation in [place], discussion with patient/house staff and interdisciplinary staff (such as , social workers, etc). Interventions were performed as documented above.

## 2024-07-15 NOTE — DIETITIAN INITIAL EVALUATION ADULT - PERTINENT MEDS FT
MEDICATIONS  (STANDING):  brimonidine 0.2% Ophthalmic Solution 1 Drop(s) Both EYES two times a day  buPROPion SR (12-Hour) 400 milliGRAM(s) Oral daily  carvedilol 25 milliGRAM(s) Oral every 12 hours  chlorhexidine 2% Cloths 1 Application(s) Topical daily  dextrose 10% Bolus 125 milliLiter(s) IV Bolus once  dextrose 5%. 1000 milliLiter(s) (50 mL/Hr) IV Continuous <Continuous>  dextrose 5%. 1000 milliLiter(s) (100 mL/Hr) IV Continuous <Continuous>  dextrose 50% Injectable 12.5 Gram(s) IV Push once  dextrose 50% Injectable 25 Gram(s) IV Push once  DULoxetine 80 milliGRAM(s) Oral daily  glucagon  Injectable 1 milliGRAM(s) IntraMuscular once  hydrALAZINE 75 milliGRAM(s) Oral three times a day  insulin lispro (ADMELOG) corrective regimen sliding scale   SubCutaneous every 6 hours  melatonin 3 milliGRAM(s) Oral at bedtime  mupirocin 2% Ointment 1 Application(s) Both Nostrils two times a day  NIFEdipine XL 90 milliGRAM(s) Oral daily  pantoprazole    Tablet 40 milliGRAM(s) Oral before breakfast  polyethylene glycol 3350 17 Gram(s) Oral daily  senna 2 Tablet(s) Oral at bedtime    MEDICATIONS  (PRN):  acetaminophen     Tablet .. 650 milliGRAM(s) Oral every 6 hours PRN Temp greater or equal to 38.5C (101.3F), Mild Pain (1 - 3), Moderate Pain (4 - 6)  dextrose Oral Gel 15 Gram(s) Oral once PRN Blood Glucose LESS THAN 70 milliGRAM(s)/deciliter

## 2024-07-15 NOTE — DIETITIAN INITIAL EVALUATION ADULT - PERTINENT LABORATORY DATA
07-15    135  |  100  |  28<H>  ----------------------------<  120<H>  3.6   |  22  |  1.00    Ca    8.7      15 Jul 2024 04:30  Phos  2.1     07-15  Mg     1.90     07-15    TPro  6.4  /  Alb  3.3  /  TBili  0.4  /  DBili  x   /  AST  21  /  ALT  18  /  AlkPhos  62  07-15  POCT Blood Glucose.: 161 mg/dL (07-15-24 @ 06:47)

## 2024-07-15 NOTE — PROGRESS NOTE ADULT - PROBLEM SELECTOR PLAN 1
- Pt hypoxic at home low 80s, in ED given nonrebreather now satting 95% on 10L  - COVID positive  - CXR and CT Chest (7/7) demonstrate lower lobe atlectasis with pleural effusion, cannot exclude PNA.   - CXR (7/10): small layering pleural effusion  - Procalcitonin elevated  - D-dimer elevated, cannot exclude resp fail with PE  2/2 covid hypercoaguable state  - Bedside POCUS (7/10): no evidence of fluid overload  - venous duplex LE DVT (7/10): neg  - proBNP elevated: 4741  - TTE no RHS    Plan:  - Pt not candidate for remdesevir given poor renal function  - d/c'ed decadron 6mg IV (7/8-7/11). Pt given decadron 10mg on 7/7   - c/w IV ceftriaxone 1g. elevated procal. (day 4)  - on venturi mask 3 L - will try to wean down to NC  - continue to monitor VBG   - hold V/Q scan, patient cannot tolerate lying flat  --likely not needed in the setting of neg echo, and improving clinical status - Pt hypoxic at home low 80s, in ED given nonrebreather now satting 95% on 10L  - COVID positive  - CXR and CT Chest (7/7) demonstrate lower lobe atlectasis with pleural effusion, cannot exclude PNA.   - CXR (7/10): small layering pleural effusion  - Procalcitonin elevated  - D-dimer elevated, cannot exclude resp fail with PE  2/2 covid hypercoaguable state  - Bedside POCUS (7/10): no evidence of fluid overload  - venous duplex LE DVT (7/10): neg  - proBNP elevated: 4741  - TTE no RHS    Plan:  - Pt not candidate for remdesevir given poor renal function  - d/c'ed decadron 6mg IV (7/8-7/11). Pt given decadron 10mg on 7/7   - completed ceftriaxone 1g. elevated procal. (7/15)  - on RA  - continue to monitor VBG   - hold V/Q scan, patient cannot tolerate lying flat  --likely not needed in the setting of neg echo, and improving clinical status

## 2024-07-15 NOTE — PHYSICAL THERAPY INITIAL EVALUATION ADULT - ADDITIONAL COMMENTS
Pt. owns a rolling walker, rollator, shower chair, bathroom grab bars, and stair lift.     Pt. was left in bed post PT Evaluation, no apparent distress, all lines intact, SpO2 98%, HR 74 bpm, call bell within reach, family present.

## 2024-07-15 NOTE — PROGRESS NOTE ADULT - SUBJECTIVE AND OBJECTIVE BOX
Progress Note    07-08-24 (7d)    Patient is a 89y old  Female who presents with a chief complaint of COVID w/ hypoxia (14 Jul 2024 07:18)      Subjective / Overnight Events :  - No acute events overnight.  - Pt seen and examined at bedside.     Additional ROS (if any):    MEDICATIONS  (STANDING):  brimonidine 0.2% Ophthalmic Solution 1 Drop(s) Both EYES two times a day  buPROPion SR (12-Hour) 400 milliGRAM(s) Oral daily  carvedilol 25 milliGRAM(s) Oral every 12 hours  cefTRIAXone   IVPB 1000 milliGRAM(s) IV Intermittent every 24 hours  chlorhexidine 2% Cloths 1 Application(s) Topical daily  dextrose 10% Bolus 125 milliLiter(s) IV Bolus once  dextrose 5%. 1000 milliLiter(s) (50 mL/Hr) IV Continuous <Continuous>  dextrose 5%. 1000 milliLiter(s) (100 mL/Hr) IV Continuous <Continuous>  dextrose 50% Injectable 12.5 Gram(s) IV Push once  dextrose 50% Injectable 25 Gram(s) IV Push once  DULoxetine 80 milliGRAM(s) Oral daily  glucagon  Injectable 1 milliGRAM(s) IntraMuscular once  hydrALAZINE 75 milliGRAM(s) Oral three times a day  insulin lispro (ADMELOG) corrective regimen sliding scale   SubCutaneous every 6 hours  melatonin 3 milliGRAM(s) Oral at bedtime  mupirocin 2% Ointment 1 Application(s) Both Nostrils two times a day  NIFEdipine XL 90 milliGRAM(s) Oral daily  pantoprazole    Tablet 40 milliGRAM(s) Oral before breakfast  polyethylene glycol 3350 17 Gram(s) Oral daily  senna 2 Tablet(s) Oral at bedtime    MEDICATIONS  (PRN):  acetaminophen     Tablet .. 650 milliGRAM(s) Oral every 6 hours PRN Temp greater or equal to 38.5C (101.3F), Mild Pain (1 - 3), Moderate Pain (4 - 6)  dextrose Oral Gel 15 Gram(s) Oral once PRN Blood Glucose LESS THAN 70 milliGRAM(s)/deciliter          PHYSICAL EXAM:  Vital Signs Last 24 Hrs  T(C): 36.3 (15 Jul 2024 01:33), Max: 36.7 (14 Jul 2024 09:08)  T(F): 97.4 (15 Jul 2024 01:33), Max: 98.1 (14 Jul 2024 09:08)  HR: 86 (15 Jul 2024 01:33) (80 - 86)  BP: 161/60 (15 Jul 2024 01:33) (156/66 - 162/71)  BP(mean): --  RR: 18 (15 Jul 2024 01:33) (17 - 18)  SpO2: 98% (15 Jul 2024 01:33) (97% - 99%)    Parameters below as of 15 Jul 2024 01:33  Patient On (Oxygen Delivery Method): mask, Venturi    O2 Concentration (%): 24    I&O's Summary    13 Jul 2024 07:01  -  14 Jul 2024 07:00  --------------------------------------------------------  IN: 540 mL / OUT: 1000 mL / NET: -460 mL    14 Jul 2024 07:01  -  15 Jul 2024 06:48  --------------------------------------------------------  IN: 1130 mL / OUT: 1350 mL / NET: -220 mL        General: NAD, non-toxic appearing   HEENT: PERRLA, EOMi, no scleral icterus  CV: RRR, normal S1 and S2, no m/r/g  Lungs: normal respiratory effort. CTAB, no wheezes, rales, or rhonchi  Abd: soft, nontender, nondistended  Ext: no edema, 2+ peripheral pulses   Pysch: AAOx3, appropriate affect   Neuro: grossly non-focal, moving all extremities spontaneously   Skin: no rashes or lesions     LABS:  CAPILLARY BLOOD GLUCOSE      POCT Blood Glucose.: 161 mg/dL (15 Jul 2024 06:47)  POCT Blood Glucose.: 125 mg/dL (15 Jul 2024 01:51)  POCT Blood Glucose.: 161 mg/dL (14 Jul 2024 21:45)  POCT Blood Glucose.: 115 mg/dL (14 Jul 2024 17:15)  POCT Blood Glucose.: 203 mg/dL (14 Jul 2024 11:10)                              12.4   7.45  )-----------( 233      ( 15 Jul 2024 04:30 )             38.6       WBC Trend: 7.45<--, 6.93<--, 7.06<--  Hb Trend: 12.4<--, 11.8<--, 12.2<--, 12.9<--, 13.0<--    07-15    135  |  100  |  28<H>  ----------------------------<  120<H>  3.6   |  22  |  1.00    Ca    8.7      15 Jul 2024 04:30  Phos  2.1     07-15  Mg     1.90     07-15    TPro  6.4  /  Alb  3.3  /  TBili  0.4  /  DBili  x   /  AST  21  /  ALT  18  /  AlkPhos  62  07-15          Urinalysis Basic - ( 15 Jul 2024 04:30 )    Color: x / Appearance: x / SG: x / pH: x  Gluc: 120 mg/dL / Ketone: x  / Bili: x / Urobili: x   Blood: x / Protein: x / Nitrite: x   Leuk Esterase: x / RBC: x / WBC x   Sq Epi: x / Non Sq Epi: x / Bacteria: x            RADIOLOGY & ADDITIONAL TESTS: Reviewed Progress Note    07-08-24 (7d)    Patient is a 89y old  Female who presents with a chief complaint of COVID w/ hypoxia (14 Jul 2024 07:18)      Subjective / Overnight Events :  - No acute events overnight.  - Pt seen and examined at bedside.   - Pt feeling better. States she is having mild rectal pain where her hemorrhoid is. Reports her constipation has also improved. Otherwise denies cp, sob, abd pain, dysuria.    Additional ROS (if any):    MEDICATIONS  (STANDING):  brimonidine 0.2% Ophthalmic Solution 1 Drop(s) Both EYES two times a day  buPROPion SR (12-Hour) 400 milliGRAM(s) Oral daily  carvedilol 25 milliGRAM(s) Oral every 12 hours  cefTRIAXone   IVPB 1000 milliGRAM(s) IV Intermittent every 24 hours  chlorhexidine 2% Cloths 1 Application(s) Topical daily  dextrose 10% Bolus 125 milliLiter(s) IV Bolus once  dextrose 5%. 1000 milliLiter(s) (50 mL/Hr) IV Continuous <Continuous>  dextrose 5%. 1000 milliLiter(s) (100 mL/Hr) IV Continuous <Continuous>  dextrose 50% Injectable 12.5 Gram(s) IV Push once  dextrose 50% Injectable 25 Gram(s) IV Push once  DULoxetine 80 milliGRAM(s) Oral daily  glucagon  Injectable 1 milliGRAM(s) IntraMuscular once  hydrALAZINE 75 milliGRAM(s) Oral three times a day  insulin lispro (ADMELOG) corrective regimen sliding scale   SubCutaneous every 6 hours  melatonin 3 milliGRAM(s) Oral at bedtime  mupirocin 2% Ointment 1 Application(s) Both Nostrils two times a day  NIFEdipine XL 90 milliGRAM(s) Oral daily  pantoprazole    Tablet 40 milliGRAM(s) Oral before breakfast  polyethylene glycol 3350 17 Gram(s) Oral daily  senna 2 Tablet(s) Oral at bedtime    MEDICATIONS  (PRN):  acetaminophen     Tablet .. 650 milliGRAM(s) Oral every 6 hours PRN Temp greater or equal to 38.5C (101.3F), Mild Pain (1 - 3), Moderate Pain (4 - 6)  dextrose Oral Gel 15 Gram(s) Oral once PRN Blood Glucose LESS THAN 70 milliGRAM(s)/deciliter          PHYSICAL EXAM:  Vital Signs Last 24 Hrs  T(C): 36.3 (15 Jul 2024 01:33), Max: 36.7 (14 Jul 2024 09:08)  T(F): 97.4 (15 Jul 2024 01:33), Max: 98.1 (14 Jul 2024 09:08)  HR: 86 (15 Jul 2024 01:33) (80 - 86)  BP: 161/60 (15 Jul 2024 01:33) (156/66 - 162/71)  BP(mean): --  RR: 18 (15 Jul 2024 01:33) (17 - 18)  SpO2: 98% (15 Jul 2024 01:33) (97% - 99%)    Parameters below as of 15 Jul 2024 01:33  Patient On (Oxygen Delivery Method): mask, Venturi    O2 Concentration (%): 24    I&O's Summary    13 Jul 2024 07:01  -  14 Jul 2024 07:00  --------------------------------------------------------  IN: 540 mL / OUT: 1000 mL / NET: -460 mL    14 Jul 2024 07:01  -  15 Jul 2024 06:48  --------------------------------------------------------  IN: 1130 mL / OUT: 1350 mL / NET: -220 mL        General: NAD   CV: RRR, normal S1 and S2, no m/r/g  Lungs: normal respiratory effort. CTAB, no wheezes, rales, or rhonchi  Abd: soft, nontender, nondistended  Ext: no edema  Pysch: AAOx3    LABS:  CAPILLARY BLOOD GLUCOSE      POCT Blood Glucose.: 161 mg/dL (15 Jul 2024 06:47)  POCT Blood Glucose.: 125 mg/dL (15 Jul 2024 01:51)  POCT Blood Glucose.: 161 mg/dL (14 Jul 2024 21:45)  POCT Blood Glucose.: 115 mg/dL (14 Jul 2024 17:15)  POCT Blood Glucose.: 203 mg/dL (14 Jul 2024 11:10)                              12.4   7.45  )-----------( 233      ( 15 Jul 2024 04:30 )             38.6       WBC Trend: 7.45<--, 6.93<--, 7.06<--  Hb Trend: 12.4<--, 11.8<--, 12.2<--, 12.9<--, 13.0<--    07-15    135  |  100  |  28<H>  ----------------------------<  120<H>  3.6   |  22  |  1.00    Ca    8.7      15 Jul 2024 04:30  Phos  2.1     07-15  Mg     1.90     07-15    TPro  6.4  /  Alb  3.3  /  TBili  0.4  /  DBili  x   /  AST  21  /  ALT  18  /  AlkPhos  62  07-15          Urinalysis Basic - ( 15 Jul 2024 04:30 )    Color: x / Appearance: x / SG: x / pH: x  Gluc: 120 mg/dL / Ketone: x  / Bili: x / Urobili: x   Blood: x / Protein: x / Nitrite: x   Leuk Esterase: x / RBC: x / WBC x   Sq Epi: x / Non Sq Epi: x / Bacteria: x            RADIOLOGY & ADDITIONAL TESTS: Reviewed

## 2024-07-15 NOTE — DIETITIAN INITIAL EVALUATION ADULT - OTHER INFO
89F PMH HTN, T2DM, glaucoma, depression, anemia 2/2 gi bleed presenting with hypoxic respiratory failure likely 2/2 COVID. Possible alternative considerations also include PE 2/2 COVID hypercoaguable state. Pt also found to have uremia likely 2/2 post renal urinary retention.    Pt seen, observed tray at bedside with 75% intake of meals. No GI distress (nausea/vomiting/diarrhea/constipation.) at present per nursing. Noted skin with ecchymosis, +1 generalized edema, +2 edema Lt & Rt leg per nursing flow sheet. Pt refused to do physical assessment.

## 2024-07-16 ENCOUNTER — TRANSCRIPTION ENCOUNTER (OUTPATIENT)
Age: 89
End: 2024-07-16

## 2024-07-16 LAB
GLUCOSE BLDC GLUCOMTR-MCNC: 110 MG/DL — HIGH (ref 70–99)
GLUCOSE BLDC GLUCOMTR-MCNC: 129 MG/DL — HIGH (ref 70–99)
GLUCOSE BLDC GLUCOMTR-MCNC: 154 MG/DL — HIGH (ref 70–99)
GLUCOSE BLDC GLUCOMTR-MCNC: 193 MG/DL — HIGH (ref 70–99)

## 2024-07-16 PROCEDURE — 99232 SBSQ HOSP IP/OBS MODERATE 35: CPT

## 2024-07-16 RX ORDER — HYDRALAZINE HYDROCHLORIDE 50 MG/1
1 TABLET ORAL
Refills: 0 | DISCHARGE

## 2024-07-16 RX ORDER — DULOXETINE HYDROCHLORIDE 20 MG/1
2 CAPSULE, DELAYED RELEASE ORAL
Qty: 0 | Refills: 0 | DISCHARGE

## 2024-07-16 RX ORDER — SENNOSIDES 8.6 MG
2 TABLET ORAL
Qty: 0 | Refills: 0 | DISCHARGE
Start: 2024-07-16

## 2024-07-16 RX ORDER — HYDROCORTISONE VALERATE 0.2 %
1 CREAM (GRAM) TOPICAL
Qty: 0 | Refills: 0 | DISCHARGE
Start: 2024-07-16

## 2024-07-16 RX ORDER — HYDRALAZINE HYDROCHLORIDE 50 MG/1
3 TABLET ORAL
Qty: 0 | Refills: 0 | DISCHARGE
Start: 2024-07-16

## 2024-07-16 RX ORDER — POLYETHYLENE GLYCOL 3350 1 G/G
17 POWDER ORAL
Qty: 0 | Refills: 0 | DISCHARGE
Start: 2024-07-16

## 2024-07-16 RX ORDER — LOSARTAN POTASSIUM 100 MG/1
1 TABLET, FILM COATED ORAL
Refills: 0 | DISCHARGE

## 2024-07-16 RX ORDER — HYDROCORTISONE VALERATE 0.2 %
1 CREAM (GRAM) TOPICAL DAILY
Refills: 0 | Status: DISCONTINUED | OUTPATIENT
Start: 2024-07-16 | End: 2024-07-17

## 2024-07-16 RX ORDER — BRIMONIDINE TARTRATE 1.5 MG/ML
1 SOLUTION/ DROPS OPHTHALMIC
Qty: 0 | Refills: 0 | DISCHARGE
Start: 2024-07-16

## 2024-07-16 RX ORDER — BRIMONIDINE TARTRATE 1.5 MG/ML
1 SOLUTION/ DROPS OPHTHALMIC
Refills: 0 | DISCHARGE

## 2024-07-16 RX ORDER — BUMETANIDE 0.25 MG/ML
1 INJECTION INTRAMUSCULAR; INTRAVENOUS
Refills: 0 | DISCHARGE

## 2024-07-16 RX ORDER — GABAPENTIN
0 POWDER (GRAM) MISCELLANEOUS
Refills: 0 | DISCHARGE

## 2024-07-16 RX ADMIN — Medication 3 MILLIGRAM(S): at 22:28

## 2024-07-16 RX ADMIN — INSULIN LISPRO 2: 100 INJECTION, SOLUTION SUBCUTANEOUS at 23:41

## 2024-07-16 RX ADMIN — BUPROPION HYDROCHLORIDE 400 MILLIGRAM(S): 150 TABLET, EXTENDED RELEASE ORAL at 12:17

## 2024-07-16 RX ADMIN — BRIMONIDINE TARTRATE 1 DROP(S): 1.5 SOLUTION/ DROPS OPHTHALMIC at 05:37

## 2024-07-16 RX ADMIN — MUPIROCIN 1 APPLICATION(S): 20 OINTMENT TOPICAL at 05:37

## 2024-07-16 RX ADMIN — PANTOPRAZOLE SODIUM 40 MILLIGRAM(S): 40 INJECTION, POWDER, FOR SOLUTION INTRAVENOUS at 05:38

## 2024-07-16 RX ADMIN — CARVEDILOL PHOSPHATE 25 MILLIGRAM(S): 80 CAPSULE, EXTENDED RELEASE ORAL at 05:38

## 2024-07-16 RX ADMIN — Medication 1 APPLICATION(S): at 12:16

## 2024-07-16 RX ADMIN — INSULIN LISPRO 2: 100 INJECTION, SOLUTION SUBCUTANEOUS at 12:15

## 2024-07-16 RX ADMIN — BRIMONIDINE TARTRATE 1 DROP(S): 1.5 SOLUTION/ DROPS OPHTHALMIC at 18:23

## 2024-07-16 RX ADMIN — Medication 1 SUPPOSITORY(S): at 15:30

## 2024-07-16 RX ADMIN — Medication 2 TABLET(S): at 22:28

## 2024-07-16 RX ADMIN — DULOXETINE HYDROCHLORIDE 80 MILLIGRAM(S): 20 CAPSULE, DELAYED RELEASE ORAL at 12:18

## 2024-07-16 RX ADMIN — CARVEDILOL PHOSPHATE 25 MILLIGRAM(S): 80 CAPSULE, EXTENDED RELEASE ORAL at 18:22

## 2024-07-16 RX ADMIN — POLYETHYLENE GLYCOL 3350 17 GRAM(S): 1 POWDER ORAL at 12:17

## 2024-07-16 RX ADMIN — Medication 90 MILLIGRAM(S): at 05:35

## 2024-07-16 RX ADMIN — MUPIROCIN 1 APPLICATION(S): 20 OINTMENT TOPICAL at 18:22

## 2024-07-16 RX ADMIN — HYDRALAZINE HYDROCHLORIDE 75 MILLIGRAM(S): 50 TABLET ORAL at 05:35

## 2024-07-16 RX ADMIN — HYDRALAZINE HYDROCHLORIDE 75 MILLIGRAM(S): 50 TABLET ORAL at 22:28

## 2024-07-16 NOTE — PROGRESS NOTE ADULT - PROBLEM SELECTOR PLAN 1
- Pt hypoxic at home low 80s, in ED given nonrebreather now satting 95% on 10L  - COVID positive  - CXR and CT Chest (7/7) demonstrate lower lobe atlectasis with pleural effusion, cannot exclude PNA.   - CXR (7/10): small layering pleural effusion  - Procalcitonin elevated  - D-dimer elevated, cannot exclude resp fail with PE  2/2 covid hypercoaguable state  - Bedside POCUS (7/10): no evidence of fluid overload  - venous duplex LE DVT (7/10): neg  - proBNP elevated: 4741  - TTE no RHS    Plan:  - Pt not candidate for remdesevir given poor renal function  - d/c'ed decadron 6mg IV (7/8-7/11). Pt given decadron 10mg on 7/7   - completed ceftriaxone 1g. elevated procal. (7/15)  - on RA  - continue to monitor VBG   - hold V/Q scan, patient cannot tolerate lying flat  --likely not needed in the setting of neg echo, and improving clinical status, pt sats 98% on RA

## 2024-07-16 NOTE — PROGRESS NOTE ADULT - PROBLEM SELECTOR PLAN 9
DVT: SCD  Diet: Soft and bite sized  Code Status: DNR/DNI w NIV  Dispo: PT rec rehab    Hemorrhoids- added anusol suppository    Dc to rehab today, dc planning time spent in coordination 40mts ( preparing dc summary and plan)  Plan discussed with ACP

## 2024-07-16 NOTE — PROGRESS NOTE ADULT - SUBJECTIVE AND OBJECTIVE BOX
Patient is a 89y old  Female who presents with a chief complaint of Infection due to severe acute respiratory syndrome coronavirus 2 (SARS-CoV-2)     (15 Jul 2024 09:14)      SUBJECTIVE / OVERNIGHT EVENTS: Pt seen and examined at 11:40am, no overnight events, pt reports feeling weak, still with some cough, reports rectal pain, has a h/o hemorrhoids and takes hydrocortisone suppository, had small bm this am, usually has constipation, daughters at bedside, no other new issues reported.    MEDICATIONS  (STANDING):  brimonidine 0.2% Ophthalmic Solution 1 Drop(s) Both EYES two times a day  buPROPion SR (12-Hour) 400 milliGRAM(s) Oral daily  carvedilol 25 milliGRAM(s) Oral every 12 hours  chlorhexidine 2% Cloths 1 Application(s) Topical daily  dextrose 10% Bolus 125 milliLiter(s) IV Bolus once  dextrose 5%. 1000 milliLiter(s) (50 mL/Hr) IV Continuous <Continuous>  dextrose 5%. 1000 milliLiter(s) (100 mL/Hr) IV Continuous <Continuous>  dextrose 50% Injectable 25 Gram(s) IV Push once  dextrose 50% Injectable 12.5 Gram(s) IV Push once  DULoxetine 80 milliGRAM(s) Oral daily  glucagon  Injectable 1 milliGRAM(s) IntraMuscular once  hydrALAZINE 75 milliGRAM(s) Oral three times a day  hydrocortisone hemorrhoidal Suppository 1 Suppository(s) Rectal daily  insulin lispro (ADMELOG) corrective regimen sliding scale   SubCutaneous every 6 hours  melatonin 3 milliGRAM(s) Oral at bedtime  mupirocin 2% Ointment 1 Application(s) Both Nostrils two times a day  NIFEdipine XL 90 milliGRAM(s) Oral daily  pantoprazole    Tablet 40 milliGRAM(s) Oral before breakfast  polyethylene glycol 3350 17 Gram(s) Oral daily  senna 2 Tablet(s) Oral at bedtime    MEDICATIONS  (PRN):  acetaminophen     Tablet .. 650 milliGRAM(s) Oral every 6 hours PRN Temp greater or equal to 38.5C (101.3F), Mild Pain (1 - 3), Moderate Pain (4 - 6)  dextrose Oral Gel 15 Gram(s) Oral once PRN Blood Glucose LESS THAN 70 milliGRAM(s)/deciliter      Vital Signs Last 24 Hrs  T(C): 36.8 (16 Jul 2024 12:12), Max: 36.8 (16 Jul 2024 12:12)  T(F): 98.2 (16 Jul 2024 12:12), Max: 98.2 (16 Jul 2024 12:12)  HR: 79 (16 Jul 2024 12:12) (79 - 748)  BP: 134/58 (16 Jul 2024 12:12) (134/58 - 163/77)  BP(mean): --  RR: 17 (16 Jul 2024 12:12) (16 - 19)  SpO2: 96% (16 Jul 2024 12:12) (96% - 98%)    Parameters below as of 16 Jul 2024 12:12  Patient On (Oxygen Delivery Method): room air      CAPILLARY BLOOD GLUCOSE      POCT Blood Glucose.: 193 mg/dL (16 Jul 2024 12:08)  POCT Blood Glucose.: 129 mg/dL (16 Jul 2024 05:47)  POCT Blood Glucose.: 115 mg/dL (15 Jul 2024 21:44)  POCT Blood Glucose.: 134 mg/dL (15 Jul 2024 17:26)    I&O's Summary    15 Jul 2024 07:01  -  16 Jul 2024 07:00  --------------------------------------------------------  IN: 580 mL / OUT: 950 mL / NET: -370 mL    16 Jul 2024 07:01  -  16 Jul 2024 13:49  --------------------------------------------------------  IN: 472 mL / OUT: 0 mL / NET: 472 mL        PHYSICAL EXAM:  GENERAL: NAD  CHEST/LUNG: Clear to auscultation bilaterally; No wheeze  HEART: Regular rate and rhythm  ABDOMEN: Soft, Nontender, Nondistended  EXTREMITIES: no LE edema  PSYCH: calm  NEUROLOGY: AA answers questions appropriately  SKIN: No rashes or lesions    LABS:                        12.4   7.45  )-----------( 233      ( 15 Jul 2024 04:30 )             38.6     07-15    135  |  100  |  28<H>  ----------------------------<  120<H>  3.6   |  22  |  1.00    Ca    8.7      15 Jul 2024 04:30  Phos  2.1     07-15  Mg     1.90     07-15    TPro  6.4  /  Alb  3.3  /  TBili  0.4  /  DBili  x   /  AST  21  /  ALT  18  /  AlkPhos  62  07-15          Urinalysis Basic - ( 15 Jul 2024 04:30 )    Color: x / Appearance: x / SG: x / pH: x  Gluc: 120 mg/dL / Ketone: x  / Bili: x / Urobili: x   Blood: x / Protein: x / Nitrite: x   Leuk Esterase: x / RBC: x / WBC x   Sq Epi: x / Non Sq Epi: x / Bacteria: x        RADIOLOGY & ADDITIONAL TESTS:    Imaging Personally Reviewed:    Consultant(s) Notes Reviewed:      Care Discussed with Consultants/Other Providers:

## 2024-07-16 NOTE — DISCHARGE NOTE NURSING/CASE MANAGEMENT/SOCIAL WORK - PATIENT PORTAL LINK FT
You can access the FollowMyHealth Patient Portal offered by [facility] by registering at the following website: [email or web address] By joining [employer]’s FollowMyHealth portal, you will also be able to view your health information using other applications (apps) compatible with our system.

## 2024-07-16 NOTE — DISCHARGE NOTE NURSING/CASE MANAGEMENT/SOCIAL WORK - NSDCPEFALRISK_GEN_ALL_CORE
For information on Fall & Injury Prevention, visit: [email or web address] OR  [email or web address] OR  https://www.cdc.gov/steadi/patient.html

## 2024-07-16 NOTE — PROGRESS NOTE ADULT - PROBLEM SELECTOR PLAN 2
Pt presented with urinary retention x 24h  BUN/Cr elevated to 51/3.57, worsening to 73/4.5  CT shows Indeterminant 1.8 cm right renal lesion (7/7)  Contreras placed 7/8, dark yellow urine. Mental status improved  U/A positive for small amounts of bilirubin, protein 100, trace LEC. Negative bacteria, ketones, blood, WBC, RBC, b  FENa 2.8%, suggestive of Intrinsic etiology  Hyperkalemic to 6.3 --> 2 rounds of lokelma, bicarb, insulin. K improved  Hypocalcemia --> s/p calcium gluconate    renal U/S (7/10): right renal cysts. no hydronephrosis. bladder only with contreras.    Plan:  -nephro following  -hold Bumex IV  -hold fluids  -c/w contreras, strict I/Os  -continue to monitor BID BMP for K+, BUN/Cr. Ca and Phos levels

## 2024-07-16 NOTE — PROGRESS NOTE ADULT - PROBLEM SELECTOR PLAN 8
Pt family reports history of GI bleed on A/C  Pt family declines A/C during this admission, understands risks benefits of hypercoagulable state during COVID    Plan:  - c/w PPI  - hold heparin at this time

## 2024-07-17 VITALS
OXYGEN SATURATION: 98 % | SYSTOLIC BLOOD PRESSURE: 140 MMHG | TEMPERATURE: 98 F | HEART RATE: 71 BPM | RESPIRATION RATE: 17 BRPM | DIASTOLIC BLOOD PRESSURE: 59 MMHG

## 2024-07-17 LAB
ANION GAP SERPL CALC-SCNC: 13 MMOL/L — SIGNIFICANT CHANGE UP (ref 7–14)
BUN SERPL-MCNC: 32 MG/DL — HIGH (ref 7–23)
CALCIUM SERPL-MCNC: 8.2 MG/DL — LOW (ref 8.4–10.5)
CHLORIDE SERPL-SCNC: 101 MMOL/L — SIGNIFICANT CHANGE UP (ref 98–107)
CO2 SERPL-SCNC: 20 MMOL/L — LOW (ref 22–31)
CREAT SERPL-MCNC: 1.14 MG/DL — SIGNIFICANT CHANGE UP (ref 0.5–1.3)
EGFR: 44 ML/MIN/1.73M2 — LOW
GLUCOSE BLDC GLUCOMTR-MCNC: 121 MG/DL — HIGH (ref 70–99)
GLUCOSE BLDC GLUCOMTR-MCNC: 150 MG/DL — HIGH (ref 70–99)
GLUCOSE BLDC GLUCOMTR-MCNC: 179 MG/DL — HIGH (ref 70–99)
GLUCOSE SERPL-MCNC: 124 MG/DL — HIGH (ref 70–99)
HCT VFR BLD CALC: 34.3 % — LOW (ref 34.5–45)
HGB BLD-MCNC: 11.2 G/DL — LOW (ref 11.5–15.5)
MAGNESIUM SERPL-MCNC: 1.9 MG/DL — SIGNIFICANT CHANGE UP (ref 1.6–2.6)
MCHC RBC-ENTMCNC: 26.5 PG — LOW (ref 27–34)
MCHC RBC-ENTMCNC: 32.7 GM/DL — SIGNIFICANT CHANGE UP (ref 32–36)
MCV RBC AUTO: 81.1 FL — SIGNIFICANT CHANGE UP (ref 80–100)
NRBC # BLD: 0 /100 WBCS — SIGNIFICANT CHANGE UP (ref 0–0)
NRBC # FLD: 0 K/UL — SIGNIFICANT CHANGE UP (ref 0–0)
PHOSPHATE SERPL-MCNC: 2.9 MG/DL — SIGNIFICANT CHANGE UP (ref 2.5–4.5)
PLATELET # BLD AUTO: 226 K/UL — SIGNIFICANT CHANGE UP (ref 150–400)
POTASSIUM SERPL-MCNC: 3.3 MMOL/L — LOW (ref 3.5–5.3)
POTASSIUM SERPL-SCNC: 3.3 MMOL/L — LOW (ref 3.5–5.3)
RBC # BLD: 4.23 M/UL — SIGNIFICANT CHANGE UP (ref 3.8–5.2)
RBC # FLD: 15.9 % — HIGH (ref 10.3–14.5)
SODIUM SERPL-SCNC: 134 MMOL/L — LOW (ref 135–145)
WBC # BLD: 6.6 K/UL — SIGNIFICANT CHANGE UP (ref 3.8–10.5)
WBC # FLD AUTO: 6.6 K/UL — SIGNIFICANT CHANGE UP (ref 3.8–10.5)

## 2024-07-17 PROCEDURE — 99239 HOSP IP/OBS DSCHRG MGMT >30: CPT

## 2024-07-17 RX ORDER — POTASSIUM CHLORIDE 600 MG/1
40 TABLET, FILM COATED, EXTENDED RELEASE ORAL ONCE
Refills: 0 | Status: COMPLETED | OUTPATIENT
Start: 2024-07-17 | End: 2024-07-17

## 2024-07-17 RX ADMIN — Medication 1 APPLICATION(S): at 11:52

## 2024-07-17 RX ADMIN — MUPIROCIN 1 APPLICATION(S): 20 OINTMENT TOPICAL at 05:30

## 2024-07-17 RX ADMIN — CARVEDILOL PHOSPHATE 25 MILLIGRAM(S): 80 CAPSULE, EXTENDED RELEASE ORAL at 05:31

## 2024-07-17 RX ADMIN — DULOXETINE HYDROCHLORIDE 80 MILLIGRAM(S): 20 CAPSULE, DELAYED RELEASE ORAL at 11:53

## 2024-07-17 RX ADMIN — Medication 1 SUPPOSITORY(S): at 11:52

## 2024-07-17 RX ADMIN — INSULIN LISPRO 2: 100 INJECTION, SOLUTION SUBCUTANEOUS at 11:51

## 2024-07-17 RX ADMIN — BUPROPION HYDROCHLORIDE 400 MILLIGRAM(S): 150 TABLET, EXTENDED RELEASE ORAL at 11:53

## 2024-07-17 RX ADMIN — Medication 90 MILLIGRAM(S): at 05:31

## 2024-07-17 RX ADMIN — POTASSIUM CHLORIDE 40 MILLIEQUIVALENT(S): 600 TABLET, FILM COATED, EXTENDED RELEASE ORAL at 06:52

## 2024-07-17 RX ADMIN — HYDRALAZINE HYDROCHLORIDE 75 MILLIGRAM(S): 50 TABLET ORAL at 05:30

## 2024-07-17 RX ADMIN — Medication 650 MILLIGRAM(S): at 16:05

## 2024-07-17 RX ADMIN — Medication 650 MILLIGRAM(S): at 16:35

## 2024-07-17 RX ADMIN — POLYETHYLENE GLYCOL 3350 17 GRAM(S): 1 POWDER ORAL at 11:53

## 2024-07-17 RX ADMIN — BRIMONIDINE TARTRATE 1 DROP(S): 1.5 SOLUTION/ DROPS OPHTHALMIC at 05:30

## 2024-07-17 RX ADMIN — PANTOPRAZOLE SODIUM 40 MILLIGRAM(S): 40 INJECTION, POWDER, FOR SOLUTION INTRAVENOUS at 05:31

## 2024-07-17 NOTE — PROGRESS NOTE ADULT - SUBJECTIVE AND OBJECTIVE BOX
Patient is a 89y old  Female who presents with a chief complaint of COVID w/ hypoxia (16 Jul 2024 13:48)      SUBJECTIVE / OVERNIGHT EVENTS: Pt seen and examined at 11:20am, no overnight events, pt reports cough is better, had some hemorrhoidal pain last night, no sob or any other complaints, daughters at bedside, no other new issues reported.      MEDICATIONS  (STANDING):  brimonidine 0.2% Ophthalmic Solution 1 Drop(s) Both EYES two times a day  buPROPion SR (12-Hour) 400 milliGRAM(s) Oral daily  carvedilol 25 milliGRAM(s) Oral every 12 hours  chlorhexidine 2% Cloths 1 Application(s) Topical daily  dextrose 10% Bolus 125 milliLiter(s) IV Bolus once  dextrose 5%. 1000 milliLiter(s) (100 mL/Hr) IV Continuous <Continuous>  dextrose 5%. 1000 milliLiter(s) (50 mL/Hr) IV Continuous <Continuous>  dextrose 50% Injectable 25 Gram(s) IV Push once  dextrose 50% Injectable 12.5 Gram(s) IV Push once  DULoxetine 80 milliGRAM(s) Oral daily  glucagon  Injectable 1 milliGRAM(s) IntraMuscular once  hydrALAZINE 75 milliGRAM(s) Oral three times a day  hydrocortisone hemorrhoidal Suppository 1 Suppository(s) Rectal daily  insulin lispro (ADMELOG) corrective regimen sliding scale   SubCutaneous every 6 hours  melatonin 3 milliGRAM(s) Oral at bedtime  NIFEdipine XL 90 milliGRAM(s) Oral daily  pantoprazole    Tablet 40 milliGRAM(s) Oral before breakfast  polyethylene glycol 3350 17 Gram(s) Oral daily  senna 2 Tablet(s) Oral at bedtime    MEDICATIONS  (PRN):  acetaminophen     Tablet .. 650 milliGRAM(s) Oral every 6 hours PRN Temp greater or equal to 38.5C (101.3F), Mild Pain (1 - 3), Moderate Pain (4 - 6)  dextrose Oral Gel 15 Gram(s) Oral once PRN Blood Glucose LESS THAN 70 milliGRAM(s)/deciliter      Vital Signs Last 24 Hrs  T(C): 37.1 (17 Jul 2024 08:30), Max: 37.1 (17 Jul 2024 08:30)  T(F): 98.7 (17 Jul 2024 08:30), Max: 98.7 (17 Jul 2024 08:30)  HR: 73 (17 Jul 2024 08:30) (69 - 76)  BP: 141/68 (17 Jul 2024 08:30) (141/68 - 161/65)  BP(mean): --  RR: 16 (17 Jul 2024 08:30) (16 - 17)  SpO2: 96% (17 Jul 2024 08:30) (96% - 100%)    Parameters below as of 17 Jul 2024 08:30  Patient On (Oxygen Delivery Method): room air      CAPILLARY BLOOD GLUCOSE      POCT Blood Glucose.: 179 mg/dL (17 Jul 2024 11:47)  POCT Blood Glucose.: 150 mg/dL (17 Jul 2024 07:41)  POCT Blood Glucose.: 121 mg/dL (17 Jul 2024 05:34)  POCT Blood Glucose.: 154 mg/dL (16 Jul 2024 23:09)  POCT Blood Glucose.: 110 mg/dL (16 Jul 2024 16:58)    I&O's Summary    16 Jul 2024 07:01  -  17 Jul 2024 07:00  --------------------------------------------------------  IN: 708 mL / OUT: 850 mL / NET: -142 mL    17 Jul 2024 07:01  -  17 Jul 2024 13:52  --------------------------------------------------------  IN: 456 mL / OUT: 0 mL / NET: 456 mL        PHYSICAL EXAM:  GENERAL: NAD  CHEST/LUNG: Clear to auscultation bilaterally; No wheeze  HEART: Regular rate and rhythm  ABDOMEN: Soft, Nontender, Nondistended  EXTREMITIES: no LE edema  PSYCH: calm  NEUROLOGY: AA answers questions appropriately  SKIN: No rashes or lesions    LABS:                        11.2   6.60  )-----------( 226      ( 17 Jul 2024 05:43 )             34.3     07-17    134<L>  |  101  |  32<H>  ----------------------------<  124<H>  3.3<L>   |  20<L>  |  1.14    Ca    8.2<L>      17 Jul 2024 05:43  Phos  2.9     07-17  Mg     1.90     07-17            Urinalysis Basic - ( 17 Jul 2024 05:43 )    Color: x / Appearance: x / SG: x / pH: x  Gluc: 124 mg/dL / Ketone: x  / Bili: x / Urobili: x   Blood: x / Protein: x / Nitrite: x   Leuk Esterase: x / RBC: x / WBC x   Sq Epi: x / Non Sq Epi: x / Bacteria: x        RADIOLOGY & ADDITIONAL TESTS:    Imaging Personally Reviewed:    Consultant(s) Notes Reviewed:      Care Discussed with Consultants/Other Providers:

## 2024-07-17 NOTE — PROGRESS NOTE ADULT - ASSESSMENT
Pt is a 89F PMH HTN, T2DM, glaucoma, depression, anemia 2/2 gi bleed presenting with hypoxic respiratory failure likely 2/2 COVID. Possible alternative considerations also include PE 2/2 COVID hypercoaguable state. Pt also found to have uremia likely 2/2 post renal urinary retention.
Pt. with ANAMARIA
Pt is a 89F PMH HTN, T2DM, glaucoma, depression, anemia 2/2 gi bleed presenting with hypoxic respiratory failure likely 2/2 COVID. Possible alternative considerations also include PE 2/2 COVID hypercoaguable state. Pt also found to have uremia likely 2/2 post renal urinary retention.
Pt is a 89F PMH HTN, T2DM, glaucoma, depression, anemia 2/2 gi bleed presenting with hypoxic respiratory failure likely 2/2 COVID. Possible alternative considerations also include PE 2/2 COVID hypercoaguable state. Pt also found to have uremia likely 2/2 post renal urinary retention.
Pt. with ANAMARIA
Pt. with ANAMARIA
Pt is a 89F PMH HTN, T2DM, glaucoma, depression, anemia 2/2 gi bleed presenting with hypoxic respiratory failure likely 2/2 COVID. Possible alternative considerations also include PE 2/2 COVID hypercoaguable state. Pt also found to have uremia likely 2/2 post renal urinary retention.

## 2024-07-17 NOTE — PROGRESS NOTE ADULT - PROBLEM SELECTOR PROBLEM 1
ANAMARIA (acute kidney injury)
Acute respiratory failure due to COVID-19
ANAMARIA (acute kidney injury)
ANAMARIA (acute kidney injury)
Acute respiratory failure due to COVID-19

## 2024-07-17 NOTE — PROGRESS NOTE ADULT - PROBLEM SELECTOR PROBLEM 2
Hyperkalemia
ANAMARIA (acute kidney injury)
Hyperkalemia
ANAMARIA (acute kidney injury)
ANAMARIA (acute kidney injury)
Hyperkalemia
ANAMARIA (acute kidney injury)

## 2024-07-17 NOTE — PROGRESS NOTE ADULT - PROVIDER SPECIALTY LIST ADULT
Internal Medicine
Nephrology
Internal Medicine
Hospitalist
Hospitalist

## 2024-07-17 NOTE — PROGRESS NOTE ADULT - PROBLEM SELECTOR PLAN 6
Plan:  -c/w cymbalta 80mg  -c/w wellbutrin 400mg

## 2024-07-17 NOTE — PROGRESS NOTE ADULT - REASON FOR ADMISSION
COVID w/ hypoxia

## 2024-07-17 NOTE — PROGRESS NOTE ADULT - PROBLEM SELECTOR PROBLEM 3
Hyponatremia
Metabolic acidosis
Metabolic acidosis
Hyponatremia
Metabolic acidosis
Hyponatremia

## 2024-07-17 NOTE — PROGRESS NOTE ADULT - PROBLEM SELECTOR PROBLEM 6
Depression, major

## 2024-07-17 NOTE — PROGRESS NOTE ADULT - PROBLEM SELECTOR PLAN 3
Pt. with metabolic acidosis in the setting of ANAMARIA. SCO2 remains low but improved to 21 today. Monitor SCO2.    ANAMARIA resolving, will discontinue follow up. Re-consult, as needed.    If you have any questions, please feel free to contact me  Mackenzie Shell  Nephrology  [phone] / [employer] Teams(Preferred)  (After 4 pm or on weekends please page the on-call fellow).
Sodium 128 to 131 during this admission  Hypergylcemic to 250s+    Plan:  -monitor BMP
Sodium 128 to 131 during this admission  Hypergylcemic to 250s+    Na improved
Pt. with metabolic acidosis in the setting of ANAMARIA. SCO2 remains low at 15 this morning. pH WNL at 7.34. Plan, as above. Monitor pH, and SCO2.    If you have any questions, please feel free to contact me  Mackenzie Shell  Nephrology  [phone] / [employer] Teams(Preferred)  (After 4 pm or on weekends please page the on-call fellow).
Pt. with metabolic acidosis in the setting of ANAMARIA. SCO2 remains low but improved to 20 today. Plan, as above. Monitor pH, and SCO2.    If you have any questions, please feel free to contact me  Mackenzie Shell  Nephrology  [phone] / [employer] Teams(Preferred)  (After 4 pm or on weekends please page the on-call fellow).
Sodium 128 to 131 during this admission  Hypergylcemic to 250s+    Plan:  -monitor BMP
Sodium 128 to 131 during this admission  Hypergylcemic to 250s+    Na improved

## 2024-07-17 NOTE — PROGRESS NOTE ADULT - PROBLEM SELECTOR PLAN 7
Plan:  -hold betoptic 0.25%  -hold rocklatan 0.002/0.005%  -c/w brimonidine 0.2% BID

## 2024-08-13 NOTE — PROGRESS NOTE ADULT - PROBLEM SELECTOR PLAN 9
Belongings and family member escorted to .   DVT: SCD  Diet: Soft and bite sized  Code Status: DNR/DNI w NIV  Dispo: PT rec rehab    Hemorrhoids- cont anusol suppository    Hypokalemia- replete k    Dc to rehab today, dc planning time spent in coordination 40mts ( preparing dc summary and plan)  Plan discussed with ACP

## 2024-12-05 PROCEDURE — 99214 OFFICE O/P EST MOD 30 MIN: CPT

## 2024-12-05 PROCEDURE — 90833 PSYTX W PT W E/M 30 MIN: CPT

## 2025-01-07 NOTE — ED ADULT NURSE NOTE - SUICIDE SCREENING QUESTION 3
ACM case remains paused for current in patient admission and care transition program post hospital discharge.  
No

## 2025-04-14 PROCEDURE — 99214 OFFICE O/P EST MOD 30 MIN: CPT

## 2025-04-14 PROCEDURE — 90833 PSYTX W PT W E/M 30 MIN: CPT

## 2025-08-18 PROCEDURE — 90833 PSYTX W PT W E/M 30 MIN: CPT

## 2025-08-18 PROCEDURE — 99214 OFFICE O/P EST MOD 30 MIN: CPT
